# Patient Record
Sex: MALE | Race: WHITE | Employment: OTHER | URBAN - METROPOLITAN AREA
[De-identification: names, ages, dates, MRNs, and addresses within clinical notes are randomized per-mention and may not be internally consistent; named-entity substitution may affect disease eponyms.]

---

## 2018-03-12 ENCOUNTER — HOSPITAL ENCOUNTER (OUTPATIENT)
Age: 78
Discharge: HOME | End: 2018-03-14
Payer: MEDICARE

## 2018-03-12 LAB
ALBUMIN SERPL-MCNC: 4 G/DL (ref 3.5–5.2)
ALP BLD-CCNC: 76 U/L (ref 40–129)
ALT SERPL-CCNC: 15 U/L (ref 0–40)
ANION GAP SERPL CALCULATED.3IONS-SCNC: 12 MMOL/L (ref 7–16)
AST SERPL-CCNC: 18 U/L (ref 0–39)
BILIRUB SERPL-MCNC: 0.5 MG/DL (ref 0–1.2)
BUN BLDV-MCNC: 15 MG/DL (ref 8–23)
CALCIUM SERPL-MCNC: 8.9 MG/DL (ref 8.6–10.2)
CHLORIDE BLD-SCNC: 101 MMOL/L (ref 98–107)
CHOLESTEROL, TOTAL: 172 MG/DL (ref 0–199)
CO2: 28 MMOL/L (ref 22–29)
CREAT SERPL-MCNC: 1.1 MG/DL (ref 0.7–1.2)
GFR AFRICAN AMERICAN: >60
GFR NON-AFRICAN AMERICAN: >60 ML/MIN/1.73
GLUCOSE BLD-MCNC: 91 MG/DL (ref 74–109)
HDLC SERPL-MCNC: 34 MG/DL
LDL CHOLESTEROL CALCULATED: 110 MG/DL (ref 0–99)
POTASSIUM SERPL-SCNC: 4.2 MMOL/L (ref 3.5–5)
SODIUM BLD-SCNC: 141 MMOL/L (ref 132–146)
T3 TOTAL: 111.1 NG/DL (ref 80–200)
T4 TOTAL: 6.8 MCG/DL (ref 4.5–11.7)
TOTAL PROTEIN: 6.5 G/DL (ref 6.4–8.3)
TRIGL SERPL-MCNC: 142 MG/DL (ref 0–149)
TSH SERPL DL<=0.05 MIU/L-ACNC: 3.5 UIU/ML (ref 0.27–4.2)
VLDLC SERPL CALC-MCNC: 28 MG/DL

## 2018-03-12 PROCEDURE — 84436 ASSAY OF TOTAL THYROXINE: CPT

## 2018-03-12 PROCEDURE — 80061 LIPID PANEL: CPT

## 2018-03-12 PROCEDURE — 84443 ASSAY THYROID STIM HORMONE: CPT

## 2018-03-12 PROCEDURE — 84480 ASSAY TRIIODOTHYRONINE (T3): CPT

## 2018-03-12 PROCEDURE — 36415 COLL VENOUS BLD VENIPUNCTURE: CPT

## 2018-03-12 PROCEDURE — 80053 COMPREHEN METABOLIC PANEL: CPT

## 2018-05-10 ENCOUNTER — HOSPITAL ENCOUNTER (OUTPATIENT)
Dept: CT IMAGING | Age: 78
Discharge: HOME | End: 2018-05-10
Payer: MEDICARE

## 2018-05-10 DIAGNOSIS — I73.9 PVD (PERIPHERAL VASCULAR DISEASE) (HCC): ICD-10-CM

## 2018-05-10 LAB
BUN BLDV-MCNC: 15 MG/DL (ref 8–23)
CREAT SERPL-MCNC: 1.1 MG/DL (ref 0.7–1.2)
GFR AFRICAN AMERICAN: >60
GFR NON-AFRICAN AMERICAN: >60 ML/MIN/1.73

## 2018-05-10 PROCEDURE — 6360000004 HC RX CONTRAST MEDICATION: Performed by: RADIOLOGY

## 2018-05-10 PROCEDURE — 75635 CT ANGIO ABDOMINAL ARTERIES: CPT

## 2018-05-10 PROCEDURE — 36415 COLL VENOUS BLD VENIPUNCTURE: CPT

## 2018-05-10 PROCEDURE — 82565 ASSAY OF CREATININE: CPT

## 2018-05-10 PROCEDURE — 84520 ASSAY OF UREA NITROGEN: CPT

## 2018-05-10 RX ADMIN — IOPAMIDOL 150 ML: 755 INJECTION, SOLUTION INTRAVENOUS at 10:06

## 2018-10-04 ENCOUNTER — OFFICE VISIT (OUTPATIENT)
Dept: FAMILY MEDICINE CLINIC | Age: 78
End: 2018-10-04
Payer: MEDICARE

## 2018-10-04 VITALS
HEART RATE: 80 BPM | BODY MASS INDEX: 27.74 KG/M2 | TEMPERATURE: 98.4 F | SYSTOLIC BLOOD PRESSURE: 132 MMHG | DIASTOLIC BLOOD PRESSURE: 80 MMHG | WEIGHT: 183 LBS | HEIGHT: 68 IN | RESPIRATION RATE: 20 BRPM | OXYGEN SATURATION: 97 %

## 2018-10-04 DIAGNOSIS — J01.10 ACUTE NON-RECURRENT FRONTAL SINUSITIS: Primary | ICD-10-CM

## 2018-10-04 PROCEDURE — G8427 DOCREV CUR MEDS BY ELIG CLIN: HCPCS | Performed by: PHYSICIAN ASSISTANT

## 2018-10-04 PROCEDURE — 1101F PT FALLS ASSESS-DOCD LE1/YR: CPT | Performed by: PHYSICIAN ASSISTANT

## 2018-10-04 PROCEDURE — 4004F PT TOBACCO SCREEN RCVD TLK: CPT | Performed by: PHYSICIAN ASSISTANT

## 2018-10-04 PROCEDURE — G8484 FLU IMMUNIZE NO ADMIN: HCPCS | Performed by: PHYSICIAN ASSISTANT

## 2018-10-04 PROCEDURE — G8419 CALC BMI OUT NRM PARAM NOF/U: HCPCS | Performed by: PHYSICIAN ASSISTANT

## 2018-10-04 PROCEDURE — 4040F PNEUMOC VAC/ADMIN/RCVD: CPT | Performed by: PHYSICIAN ASSISTANT

## 2018-10-04 PROCEDURE — 99213 OFFICE O/P EST LOW 20 MIN: CPT | Performed by: PHYSICIAN ASSISTANT

## 2018-10-04 PROCEDURE — 1123F ACP DISCUSS/DSCN MKR DOCD: CPT | Performed by: PHYSICIAN ASSISTANT

## 2018-10-04 RX ORDER — AMOXICILLIN 500 MG/1
500 CAPSULE ORAL 2 TIMES DAILY
Qty: 20 CAPSULE | Refills: 0 | Status: SHIPPED | OUTPATIENT
Start: 2018-10-04 | End: 2018-10-14

## 2019-08-01 ENCOUNTER — HOSPITAL ENCOUNTER (OUTPATIENT)
Age: 79
Discharge: HOME | End: 2019-08-03
Payer: MEDICARE

## 2019-08-01 LAB
ALBUMIN SERPL-MCNC: 4.2 G/DL (ref 3.5–5.2)
ALP BLD-CCNC: 84 U/L (ref 40–129)
ALT SERPL-CCNC: 23 U/L (ref 0–40)
ANION GAP SERPL CALCULATED.3IONS-SCNC: 14 MMOL/L (ref 7–16)
AST SERPL-CCNC: 21 U/L (ref 0–39)
BILIRUB SERPL-MCNC: 0.6 MG/DL (ref 0–1.2)
BUN BLDV-MCNC: 14 MG/DL (ref 8–23)
CALCIUM SERPL-MCNC: 9 MG/DL (ref 8.6–10.2)
CHLORIDE BLD-SCNC: 105 MMOL/L (ref 98–107)
CHOLESTEROL, TOTAL: 162 MG/DL (ref 0–199)
CO2: 24 MMOL/L (ref 22–29)
CREAT SERPL-MCNC: 1.1 MG/DL (ref 0.7–1.2)
GFR AFRICAN AMERICAN: >60
GFR NON-AFRICAN AMERICAN: >60 ML/MIN/1.73
GLUCOSE BLD-MCNC: 89 MG/DL (ref 74–99)
HDLC SERPL-MCNC: 33 MG/DL
LDL CHOLESTEROL CALCULATED: 102 MG/DL (ref 0–99)
POTASSIUM SERPL-SCNC: 4.6 MMOL/L (ref 3.5–5)
SODIUM BLD-SCNC: 143 MMOL/L (ref 132–146)
T3 UPTAKE PERCENT: 33.7 % (ref 22.5–37)
T4 TOTAL: 6.6 MCG/DL (ref 4.5–11.7)
TOTAL PROTEIN: 6.7 G/DL (ref 6.4–8.3)
TRIGL SERPL-MCNC: 137 MG/DL (ref 0–149)
TSH SERPL DL<=0.05 MIU/L-ACNC: 3.48 UIU/ML (ref 0.27–4.2)
VLDLC SERPL CALC-MCNC: 27 MG/DL

## 2019-08-01 PROCEDURE — 80061 LIPID PANEL: CPT

## 2019-08-01 PROCEDURE — 84443 ASSAY THYROID STIM HORMONE: CPT

## 2019-08-01 PROCEDURE — 80053 COMPREHEN METABOLIC PANEL: CPT

## 2019-08-01 PROCEDURE — 84436 ASSAY OF TOTAL THYROXINE: CPT

## 2019-08-01 PROCEDURE — 36415 COLL VENOUS BLD VENIPUNCTURE: CPT

## 2019-08-01 PROCEDURE — 84479 ASSAY OF THYROID (T3 OR T4): CPT

## 2020-05-12 ENCOUNTER — HOSPITAL ENCOUNTER (EMERGENCY)
Age: 80
Discharge: HOME | End: 2020-05-12
Payer: MEDICARE

## 2020-05-12 VITALS
WEIGHT: 180 LBS | TEMPERATURE: 98.2 F | BODY MASS INDEX: 27.37 KG/M2 | RESPIRATION RATE: 20 BRPM | OXYGEN SATURATION: 96 % | DIASTOLIC BLOOD PRESSURE: 73 MMHG | HEART RATE: 73 BPM | SYSTOLIC BLOOD PRESSURE: 145 MMHG

## 2020-05-12 PROCEDURE — 99212 OFFICE O/P EST SF 10 MIN: CPT

## 2020-05-12 RX ORDER — DOXYCYCLINE 100 MG/1
100 CAPSULE ORAL 2 TIMES DAILY
Qty: 20 CAPSULE | Refills: 0 | Status: SHIPPED | OUTPATIENT
Start: 2020-05-12 | End: 2020-05-22

## 2020-05-12 NOTE — ED PROVIDER NOTES
This is a 78-year-old male that presents to urgent care complaining of right groin pain. He does state that he is pretty healthy for his age but noticed a raised tender red area that was draining in his right groin area today. He denies any fever or chills. No difficulty having bowel movements. No urinary symptoms. I first contact patient he appears to be in no acute distress. Review of Systems   Constitutional:        Pertinent positives and negatives are stated within HPI, all other systems reviewed and are negative. Physical Exam  Vitals signs and nursing note reviewed. Constitutional:       Appearance: He is well-developed. HENT:      Head: Normocephalic and atraumatic. Jaw: No trismus. Right Ear: Hearing, tympanic membrane, ear canal and external ear normal.      Left Ear: Hearing, tympanic membrane, ear canal and external ear normal.      Nose: Nose normal.      Right Sinus: No maxillary sinus tenderness or frontal sinus tenderness. Left Sinus: No maxillary sinus tenderness or frontal sinus tenderness. Mouth/Throat:      Pharynx: Uvula midline. No uvula swelling. Eyes:      General: Lids are normal.      Conjunctiva/sclera: Conjunctivae normal.      Pupils: Pupils are equal, round, and reactive to light. Neck:      Musculoskeletal: Normal range of motion and neck supple. Cardiovascular:      Rate and Rhythm: Normal rate and regular rhythm. Heart sounds: Normal heart sounds. No murmur. Pulmonary:      Effort: Pulmonary effort is normal.      Breath sounds: Normal breath sounds. Abdominal:      General: Bowel sounds are normal.      Palpations: Abdomen is soft. Abdomen is not rigid. Tenderness: There is no abdominal tenderness. There is no guarding or rebound. Skin:     General: Skin is warm and dry. Findings: No abrasion or rash. Comments:  In his right suprapubic area he does have a pustule or very small abscess about 1.5 cm diameter that is draining pus. There is no red streaking. There is just very mild erythema around the abscess itself. He has no pain to deep palpation in the area. The penis and the scrotum appear to be normal.  I do not see any other areas. Neurological:      Mental Status: He is alert and oriented to person, place, and time. GCS: GCS eye subscore is 4. GCS verbal subscore is 5. GCS motor subscore is 6. Cranial Nerves: No cranial nerve deficit. Sensory: No sensory deficit. Coordination: Coordination normal.      Gait: Gait normal.         Procedures    MDM  Number of Diagnoses or Management Options  Abscess:   Diagnosis management comments: This patient has a localized pustule or abscess. I will place him on an antibiotic I was able to clean off the pus and expressed some of the pus from the area. I told him to keep the area clean and return if symptoms worsen. --------------------------------------------- PAST HISTORY ---------------------------------------------  Past Medical History:  has a past medical history of Hyperlipidemia. Past Surgical History:  has a past surgical history that includes hernia repair; Cholecystectomy; and Colonoscopy (1990). Social History:  reports that he has been smoking. He has been smoking about 1.00 pack per day. He has never used smokeless tobacco. He reports that he does not drink alcohol or use drugs. Family History: family history is not on file. The patients home medications have been reviewed. Allergies: Patient has no known allergies. -------------------------------------------------- RESULTS -------------------------------------------------  No results found for this visit on 05/12/20. No orders to display       ------------------------- NURSING NOTES AND VITALS REVIEWED ---------------------------   The nursing notes within the ED encounter and vital signs as below have been reviewed.    BP (!) 145/73   Pulse 73   Temp 98.2 °F (36.8 °C) (Oral)   Resp 20   Wt 180 lb (81.6 kg)   SpO2 96%   BMI 27.37 kg/m²   Oxygen Saturation Interpretation: Normal      ------------------------------------------ PROGRESS NOTES ------------------------------------------   I have spoken with the patient and discussed todays results, in addition to providing specific details for the plan of care and counseling regarding the diagnosis and prognosis. Their questions are answered at this time and they are agreeable with the plan.      --------------------------------- ADDITIONAL PROVIDER NOTES --------------------------------     This patient is stable for discharge. I have shared the specific conditions for return, as well as the importance of follow-up. * NOTE: This report was transcribed using voice recognition software. Every effort was made to ensure accuracy; however, inadvertent computerized transcription errors may be present.    --------------------------------- IMPRESSION AND DISPOSITION ---------------------------------    IMPRESSION  1.  Abscess        DISPOSITION  Disposition: Discharge to home  Patient condition is good         Francie Cho PA-C  05/12/20 9802

## 2021-01-26 ENCOUNTER — IMMUNIZATION (OUTPATIENT)
Dept: PRIMARY CARE CLINIC | Age: 81
End: 2021-01-26
Payer: MEDICARE

## 2021-01-26 PROCEDURE — 91301 COVID-19, MODERNA VACCINE 100MCG/0.5ML DOSE: CPT | Performed by: NURSE PRACTITIONER

## 2021-01-26 PROCEDURE — 0011A COVID-19, MODERNA VACCINE 100MCG/0.5ML DOSE: CPT | Performed by: NURSE PRACTITIONER

## 2021-02-23 ENCOUNTER — IMMUNIZATION (OUTPATIENT)
Dept: PRIMARY CARE CLINIC | Age: 81
End: 2021-02-23
Payer: MEDICARE

## 2021-02-23 PROCEDURE — 0012A COVID-19, MODERNA VACCINE 100MCG/0.5ML DOSE: CPT | Performed by: NURSE PRACTITIONER

## 2021-02-23 PROCEDURE — 91301 COVID-19, MODERNA VACCINE 100MCG/0.5ML DOSE: CPT | Performed by: NURSE PRACTITIONER

## 2021-06-23 NOTE — H&P
History and Physical    Patient's Name/Date of Birth: Ann Porter / 1940 (12 y.o.)    Date: June 23, 2021     Chief Complaint: Decreased vision of the left eye    HPI: Mature left cataract with decreased vision . Risks and complications as well as options and benefits  were discussed with the patient and he has elected to proceed with left cataract extraction with intra ocular lens implant. Past Medical History:   Diagnosis Date    Hyperlipidemia        Past Surgical History:   Procedure Laterality Date    CHOLECYSTECTOMY      COLONOSCOPY  200    neg    HERNIA REPAIR         Prior to Admission medications    Not on File       Patient has no known allergies. History reviewed. No pertinent family history. Social History     Socioeconomic History    Marital status:      Spouse name: Not on file    Number of children: Not on file    Years of education: Not on file    Highest education level: Not on file   Occupational History    Occupation: retired   Tobacco Use    Smoking status: Current Every Day Smoker     Packs/day: 1.00    Smokeless tobacco: Never Used   Substance and Sexual Activity    Alcohol use: No    Drug use: No    Sexual activity: Never   Other Topics Concern    Not on file   Social History Narrative    Not on file     Social Determinants of Health     Financial Resource Strain:     Difficulty of Paying Living Expenses:    Food Insecurity:     Worried About [facility] in the Last Year:     [address] in the Last Year:    Transportation Needs:     Lack of Transportation (Medical):      Lack of Transportation (Non-Medical):    Physical Activity:     Days of Exercise per Week:     Minutes of Exercise per Session:    Stress:     Feeling of Stress :    Social Connections:     Frequency of Communication with Friends and Family:     Frequency of Social Gatherings with Friends and Family:     Attends [employer] Services:     Active Member of Clubs or Organizations:     Attends Club or Organization Meetings:     Marital Status:    Intimate Partner Violence:     Fear of Current or Ex-Partner:     Emotionally Abused:     Physically Abused:     Sexually Abused:        Review of Systems:   CONSTITUTIONAL:  negative  EYES:  negative  HEENT:  negative  RESPIRATORY:  negative  CARDIOVASCULAR:  negative  GASTROINTESTINAL:  negative  GENITOURINARY:  negative  INTEGUMENT/BREAST:  negative  HEMATOLOGIC/LYMPHATIC:  negative  ALLERGIC/IMMUNOLOGIC:  negative  ENDOCRINE:  negative  MUSCULOSKELETAL:  negative  NEUROLOGICAL:  negative  BEHAVIOR/PSYCH:  Negative        Physical Exam:  Vitals:    06/23/21 1217   Weight: 160 lb (72.6 kg)   Height: 5' 6\" (1.676 m)       CONSTITUTIONAL: Oriented to person, place and time  EYES:  Mature left cataract with decreased vision effecting reading, driving and all routine home activities. Visual acuity is 20/70    ENT:  Normocephalic, without obvious abnormality, atraumatic, sinuses nontender on palpation, external ears without lesions, oral pharynx with moist mucus membranes, tonsils without erythema or exudates, gums normal and good dentition.   NECK:  Supple, symmetrical, trachea midline, no adenopathy, thyroid symmetric, not enlarged and no tenderness, skin normal  HEMATOLOGIC/LYMPHATICS:  no cervical lymphadenopathy and no supraclavicular lymphadenopathy  BACK:  Symmetric, no curvature, spinous processes are non-tender on palpation, paraspinous muscles are non-tender on palpation, no costal vertebral tenderness  LUNGS:  No increased work of breathing, good air exchange, clear to auscultation bilaterally, no crackles or wheezing  CARDIOVASCULAR:  Normal apical impulse, regular rate and rhythm, normal S1 and S2, no S3 or S4, and no murmur noted  ABDOMEN:  No scars, normal bowel sounds, soft, non-distended, non-tender, no masses palpated, no hepatosplenomegally  CHEST/BREASTS:  Breasts symmetrical, skin without lesion(s), no nipple retraction or dimpling, no nipple discharge, no masses palpated, no axillary or supraclavicular adenopathy  GENITAL/URINARY:  Deferred  MUSCULOSKELETAL:  There is no redness, warmth, or swelling of the joints. Full range of motion noted. Motor strength is 5 out of 5 all extremities bilaterally. Tone is normal.  NEUROLOGIC:  Awake, alert, oriented to name, place and time. Cranial nerves II-XII are grossly intact. Motor is 5 out of 5 bilaterally. Cerebellar finger to nose, heel to shin intact. Sensory is intact. Babinski down going, Romberg negative, and gait is normal.  SKIN:  no bruising or bleeding, normal skin color, texture, turgor and no redness, warmth, or swelling    Assessment:  Active Problems:    * No active hospital problems. *  Resolved Problems:    * No resolved hospital problems. *      Plan:  1.  Left cataract extraction with intra ocular lens implant    Electronically signed by Saray Puente MD on 6/23/21 at 3:21 PM EDT

## 2021-06-25 ENCOUNTER — ANESTHESIA EVENT (OUTPATIENT)
Dept: OPERATING ROOM | Age: 81
End: 2021-06-25
Payer: MEDICARE

## 2021-06-29 ENCOUNTER — ANESTHESIA (OUTPATIENT)
Dept: OPERATING ROOM | Age: 81
End: 2021-06-29
Payer: MEDICARE

## 2021-06-29 ENCOUNTER — HOSPITAL ENCOUNTER (OUTPATIENT)
Age: 81
Setting detail: OUTPATIENT SURGERY
Discharge: HOME | End: 2021-06-29
Attending: OPHTHALMOLOGY | Admitting: OPHTHALMOLOGY
Payer: MEDICARE

## 2021-06-29 VITALS
SYSTOLIC BLOOD PRESSURE: 154 MMHG | DIASTOLIC BLOOD PRESSURE: 73 MMHG | OXYGEN SATURATION: 94 % | RESPIRATION RATE: 24 BRPM

## 2021-06-29 VITALS
DIASTOLIC BLOOD PRESSURE: 71 MMHG | TEMPERATURE: 97 F | RESPIRATION RATE: 16 BRPM | SYSTOLIC BLOOD PRESSURE: 156 MMHG | OXYGEN SATURATION: 97 % | WEIGHT: 175.6 LBS | BODY MASS INDEX: 28.22 KG/M2 | HEIGHT: 66 IN | HEART RATE: 66 BPM

## 2021-06-29 PROBLEM — H26.9 LEFT CATARACT: Status: ACTIVE | Noted: 2021-06-29

## 2021-06-29 PROCEDURE — 2500000003 HC RX 250 WO HCPCS: Performed by: NURSE ANESTHETIST, CERTIFIED REGISTERED

## 2021-06-29 PROCEDURE — 6370000000 HC RX 637 (ALT 250 FOR IP): Performed by: OPHTHALMOLOGY

## 2021-06-29 PROCEDURE — 7100000010 HC PHASE II RECOVERY - FIRST 15 MIN: Performed by: OPHTHALMOLOGY

## 2021-06-29 PROCEDURE — 2500000003 HC RX 250 WO HCPCS: Performed by: OPHTHALMOLOGY

## 2021-06-29 PROCEDURE — 7100000011 HC PHASE II RECOVERY - ADDTL 15 MIN: Performed by: OPHTHALMOLOGY

## 2021-06-29 PROCEDURE — 3600000003 HC SURGERY LEVEL 3 BASE: Performed by: OPHTHALMOLOGY

## 2021-06-29 PROCEDURE — V2632 POST CHMBR INTRAOCULAR LENS: HCPCS | Performed by: OPHTHALMOLOGY

## 2021-06-29 PROCEDURE — 2580000003 HC RX 258: Performed by: ANESTHESIOLOGY

## 2021-06-29 PROCEDURE — 3700000001 HC ADD 15 MINUTES (ANESTHESIA): Performed by: OPHTHALMOLOGY

## 2021-06-29 PROCEDURE — 2709999900 HC NON-CHARGEABLE SUPPLY: Performed by: OPHTHALMOLOGY

## 2021-06-29 PROCEDURE — 3700000000 HC ANESTHESIA ATTENDED CARE: Performed by: OPHTHALMOLOGY

## 2021-06-29 PROCEDURE — 3600000013 HC SURGERY LEVEL 3 ADDTL 15MIN: Performed by: OPHTHALMOLOGY

## 2021-06-29 DEVICE — LENS INTOCU +21.5 DIOPT A CONSTANT 118.8 L13MM DIA6MM 0DEG: Type: IMPLANTABLE DEVICE | Site: EYE | Status: FUNCTIONAL

## 2021-06-29 RX ORDER — HYDROCODONE BITARTRATE AND ACETAMINOPHEN 5; 325 MG/1; MG/1
1 TABLET ORAL PRN
Status: DISCONTINUED | OUTPATIENT
Start: 2021-06-29 | End: 2021-06-29 | Stop reason: HOSPADM

## 2021-06-29 RX ORDER — SODIUM CHLORIDE, SODIUM LACTATE, POTASSIUM CHLORIDE, CALCIUM CHLORIDE 600; 310; 30; 20 MG/100ML; MG/100ML; MG/100ML; MG/100ML
INJECTION, SOLUTION INTRAVENOUS CONTINUOUS
Status: DISCONTINUED | OUTPATIENT
Start: 2021-06-29 | End: 2021-06-29 | Stop reason: HOSPADM

## 2021-06-29 RX ORDER — PHENYLEPHRINE HYDROCHLORIDE 100 MG/ML
1 SOLUTION/ DROPS OPHTHALMIC PRN
Status: DISCONTINUED | OUTPATIENT
Start: 2021-06-29 | End: 2021-06-29 | Stop reason: HOSPADM

## 2021-06-29 RX ORDER — HYDRALAZINE HYDROCHLORIDE 20 MG/ML
5 INJECTION INTRAMUSCULAR; INTRAVENOUS EVERY 10 MIN PRN
Status: DISCONTINUED | OUTPATIENT
Start: 2021-06-29 | End: 2021-06-29 | Stop reason: HOSPADM

## 2021-06-29 RX ORDER — FLURBIPROFEN SODIUM 0.3 MG/ML
1 SOLUTION/ DROPS OPHTHALMIC
Status: COMPLETED | OUTPATIENT
Start: 2021-06-29 | End: 2021-06-29

## 2021-06-29 RX ORDER — CYCLOPENTOLATE HYDROCHLORIDE 10 MG/ML
1 SOLUTION/ DROPS OPHTHALMIC
Status: COMPLETED | OUTPATIENT
Start: 2021-06-29 | End: 2021-06-29

## 2021-06-29 RX ORDER — MEPERIDINE HYDROCHLORIDE 25 MG/ML
12.5 INJECTION INTRAMUSCULAR; INTRAVENOUS; SUBCUTANEOUS EVERY 5 MIN PRN
Status: DISCONTINUED | OUTPATIENT
Start: 2021-06-29 | End: 2021-06-29 | Stop reason: HOSPADM

## 2021-06-29 RX ORDER — TETRACAINE HYDROCHLORIDE 5 MG/ML
1 SOLUTION OPHTHALMIC ONCE
Status: COMPLETED | OUTPATIENT
Start: 2021-06-29 | End: 2021-06-29

## 2021-06-29 RX ORDER — FENTANYL CITRATE 50 UG/ML
50 INJECTION, SOLUTION INTRAMUSCULAR; INTRAVENOUS EVERY 5 MIN PRN
Status: DISCONTINUED | OUTPATIENT
Start: 2021-06-29 | End: 2021-06-29 | Stop reason: HOSPADM

## 2021-06-29 RX ORDER — PHENYLEPHRINE HCL 2.5 %
1 DROPS OPHTHALMIC (EYE)
Status: COMPLETED | OUTPATIENT
Start: 2021-06-29 | End: 2021-06-29

## 2021-06-29 RX ORDER — ALFENTANIL HYDROCHLORIDE 500 UG/ML
INJECTION INTRAVENOUS PRN
Status: DISCONTINUED | OUTPATIENT
Start: 2021-06-29 | End: 2021-06-29 | Stop reason: SDUPTHER

## 2021-06-29 RX ORDER — DIPHENHYDRAMINE HYDROCHLORIDE 50 MG/ML
12.5 INJECTION INTRAMUSCULAR; INTRAVENOUS
Status: DISCONTINUED | OUTPATIENT
Start: 2021-06-29 | End: 2021-06-29 | Stop reason: HOSPADM

## 2021-06-29 RX ORDER — BALANCED SALT SOLUTION 6.4; .75; .48; .3; 3.9; 1.7 MG/ML; MG/ML; MG/ML; MG/ML; MG/ML; MG/ML
SOLUTION OPHTHALMIC PRN
Status: DISCONTINUED | OUTPATIENT
Start: 2021-06-29 | End: 2021-06-29 | Stop reason: ALTCHOICE

## 2021-06-29 RX ORDER — LABETALOL HYDROCHLORIDE 5 MG/ML
5 INJECTION, SOLUTION INTRAVENOUS EVERY 10 MIN PRN
Status: DISCONTINUED | OUTPATIENT
Start: 2021-06-29 | End: 2021-06-29 | Stop reason: HOSPADM

## 2021-06-29 RX ORDER — HYDROCODONE BITARTRATE AND ACETAMINOPHEN 5; 325 MG/1; MG/1
2 TABLET ORAL PRN
Status: DISCONTINUED | OUTPATIENT
Start: 2021-06-29 | End: 2021-06-29 | Stop reason: HOSPADM

## 2021-06-29 RX ORDER — TETRACAINE HYDROCHLORIDE 5 MG/ML
SOLUTION OPHTHALMIC PRN
Status: DISCONTINUED | OUTPATIENT
Start: 2021-06-29 | End: 2021-06-29 | Stop reason: ALTCHOICE

## 2021-06-29 RX ORDER — MORPHINE SULFATE 2 MG/ML
1 INJECTION, SOLUTION INTRAMUSCULAR; INTRAVENOUS EVERY 5 MIN PRN
Status: DISCONTINUED | OUTPATIENT
Start: 2021-06-29 | End: 2021-06-29 | Stop reason: HOSPADM

## 2021-06-29 RX ORDER — PROMETHAZINE HYDROCHLORIDE 25 MG/ML
25 INJECTION, SOLUTION INTRAMUSCULAR; INTRAVENOUS
Status: DISCONTINUED | OUTPATIENT
Start: 2021-06-29 | End: 2021-06-29 | Stop reason: HOSPADM

## 2021-06-29 RX ADMIN — CYCLOPENTOLATE HYDROCHLORIDE 1 DROP: 10 SOLUTION/ DROPS OPHTHALMIC at 05:40

## 2021-06-29 RX ADMIN — CYCLOPENTOLATE HYDROCHLORIDE 1 DROP: 10 SOLUTION/ DROPS OPHTHALMIC at 05:35

## 2021-06-29 RX ADMIN — ALFENTANIL HYDROCHLORIDE 125 MCG: 500 INJECTION INTRAVENOUS at 06:39

## 2021-06-29 RX ADMIN — PHENYLEPHRINE HYDROCHLORIDE 1 DROP: 25 SOLUTION/ DROPS OPHTHALMIC at 05:40

## 2021-06-29 RX ADMIN — ALFENTANIL HYDROCHLORIDE 250 MCG: 500 INJECTION INTRAVENOUS at 06:37

## 2021-06-29 RX ADMIN — FLURBIPROFEN SODIUM 1 DROP: 0.3 SOLUTION/ DROPS OPHTHALMIC at 05:35

## 2021-06-29 RX ADMIN — FLURBIPROFEN SODIUM 1 DROP: 0.3 SOLUTION/ DROPS OPHTHALMIC at 05:30

## 2021-06-29 RX ADMIN — ALFENTANIL HYDROCHLORIDE 125 MCG: 500 INJECTION INTRAVENOUS at 06:35

## 2021-06-29 RX ADMIN — TETRACAINE HYDROCHLORIDE 1 DROP: 25 LIQUID OPHTHALMIC at 06:04

## 2021-06-29 RX ADMIN — PHENYLEPHRINE HYDROCHLORIDE 1 DROP: 25 SOLUTION/ DROPS OPHTHALMIC at 05:30

## 2021-06-29 RX ADMIN — FLURBIPROFEN SODIUM 1 DROP: 0.3 SOLUTION/ DROPS OPHTHALMIC at 05:40

## 2021-06-29 RX ADMIN — ALFENTANIL HYDROCHLORIDE 250 MCG: 500 INJECTION INTRAVENOUS at 06:44

## 2021-06-29 RX ADMIN — ALFENTANIL HYDROCHLORIDE 250 MCG: 500 INJECTION INTRAVENOUS at 06:41

## 2021-06-29 RX ADMIN — PHENYLEPHRINE HYDROCHLORIDE 1 DROP: 25 SOLUTION/ DROPS OPHTHALMIC at 05:35

## 2021-06-29 RX ADMIN — CYCLOPENTOLATE HYDROCHLORIDE 1 DROP: 10 SOLUTION/ DROPS OPHTHALMIC at 05:30

## 2021-06-29 RX ADMIN — SODIUM CHLORIDE, POTASSIUM CHLORIDE, SODIUM LACTATE AND CALCIUM CHLORIDE: 600; 310; 30; 20 INJECTION, SOLUTION INTRAVENOUS at 06:04

## 2021-06-29 ASSESSMENT — PAIN SCALES - GENERAL
PAINLEVEL_OUTOF10: 0

## 2021-06-29 ASSESSMENT — LIFESTYLE VARIABLES: SMOKING_STATUS: 1

## 2021-06-29 ASSESSMENT — PAIN - FUNCTIONAL ASSESSMENT: PAIN_FUNCTIONAL_ASSESSMENT: 0-10

## 2021-06-29 NOTE — ANESTHESIA POSTPROCEDURE EVALUATION
Department of Anesthesiology  Postprocedure Note    Patient: Sofia Valdez  MRN: [id number]  YOB: 1940  Date of evaluation: 6/29/2021  Time:  8:02 AM     Procedure Summary     Date: 06/29/21 Room / Location: [address] [place] [address]    Anesthesia Start: 6587 Anesthesia Stop: 5016    Procedure: LEFT CATARACT EXTRACTION WITH IOL (Left ) Diagnosis: (LEFT CATARACT)    Surgeons: Hector Persaud MD Responsible Provider: Shawanda Chaudhari MD    Anesthesia Type: MAC ASA Status: 2          Anesthesia Type: MAC    Frances Phase I: Frances Score: 10    Frances Phase II: Frances Score: 10    Last vitals: Reviewed and per EMR flowsheets.        Anesthesia Post Evaluation    Patient location during evaluation: PACU  Patient participation: complete - patient participated  Level of consciousness: awake and alert  Airway patency: patent  Nausea & Vomiting: no nausea and no vomiting  Complications: no  Respiratory status: room air and spontaneous ventilation  Hydration status: stable

## 2021-06-29 NOTE — H&P
Update History & Physical    The patient's History and Physical of 6 / 23 / 2021 was reviewed with the patient and there were no significant changes. I examined the patient and there were no significant changes from the previous History and Physical.    Plan: The risk, benefits, expected outcome, and alternative to the recommended procedure have been discussed with the patient. Patient understands and wants to proceed with the procedure.     Electronically signed by Jocelynn Cervantes MD on 6/29/21 at 6:41 AM EDT

## 2021-09-22 ENCOUNTER — OFFICE VISIT (OUTPATIENT)
Dept: FAMILY MEDICINE CLINIC | Age: 81
End: 2021-09-22
Payer: MEDICARE

## 2021-09-22 VITALS
SYSTOLIC BLOOD PRESSURE: 131 MMHG | DIASTOLIC BLOOD PRESSURE: 72 MMHG | HEIGHT: 66 IN | HEART RATE: 75 BPM | TEMPERATURE: 97.2 F | RESPIRATION RATE: 17 BRPM | WEIGHT: 175 LBS | BODY MASS INDEX: 28.12 KG/M2 | OXYGEN SATURATION: 98 %

## 2021-09-22 DIAGNOSIS — M25.511 ACUTE PAIN OF RIGHT SHOULDER: Primary | ICD-10-CM
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
## 2021-09-22 PROCEDURE — G8417 CALC BMI ABV UP PARAM F/U: HCPCS | Performed by: NURSE PRACTITIONER

## 2021-09-22 PROCEDURE — 96372 THER/PROPH/DIAG INJ SC/IM: CPT | Performed by: NURSE PRACTITIONER

## 2021-09-22 PROCEDURE — 1123F ACP DISCUSS/DSCN MKR DOCD: CPT | Performed by: NURSE PRACTITIONER

## 2021-09-22 PROCEDURE — 99213 OFFICE O/P EST LOW 20 MIN: CPT | Performed by: NURSE PRACTITIONER

## 2021-09-22 PROCEDURE — 4040F PNEUMOC VAC/ADMIN/RCVD: CPT | Performed by: NURSE PRACTITIONER

## 2021-09-22 PROCEDURE — G8427 DOCREV CUR MEDS BY ELIG CLIN: HCPCS | Performed by: NURSE PRACTITIONER

## 2021-09-22 PROCEDURE — 4004F PT TOBACCO SCREEN RCVD TLK: CPT | Performed by: NURSE PRACTITIONER

## 2021-09-22 RX ORDER — TRIAMCINOLONE ACETONIDE 40 MG/ML
40 INJECTION, SUSPENSION INTRA-ARTICULAR; INTRAMUSCULAR ONCE
Status: COMPLETED | OUTPATIENT
Start: 2021-09-22 | End: 2021-09-22

## 2021-09-22 RX ORDER — KETOROLAC TROMETHAMINE 30 MG/ML
30 INJECTION, SOLUTION INTRAMUSCULAR; INTRAVENOUS ONCE
Status: COMPLETED | OUTPATIENT
Start: 2021-09-22 | End: 2021-09-22

## 2021-09-22 RX ADMIN — TRIAMCINOLONE ACETONIDE 40 MG: 40 INJECTION, SUSPENSION INTRA-ARTICULAR; INTRAMUSCULAR at 15:50

## 2021-09-22 RX ADMIN — KETOROLAC TROMETHAMINE 30 MG: 30 INJECTION, SOLUTION INTRAMUSCULAR; INTRAVENOUS at 15:51

## 2021-09-22 SDOH — ECONOMIC STABILITY: FOOD INSECURITY: WITHIN THE PAST 12 MONTHS, THE FOOD YOU BOUGHT JUST DIDN'T LAST AND YOU DIDN'T HAVE MONEY TO GET MORE.: NEVER TRUE

## 2021-09-22 SDOH — ECONOMIC STABILITY: FOOD INSECURITY: WITHIN THE PAST 12 MONTHS, YOU WORRIED THAT YOUR FOOD WOULD RUN OUT BEFORE YOU GOT MONEY TO BUY MORE.: NEVER TRUE

## 2021-09-22 ASSESSMENT — PATIENT HEALTH QUESTIONNAIRE - PHQ9
SUM OF ALL RESPONSES TO PHQ9 QUESTIONS 1 & 2: 0
SUM OF ALL RESPONSES TO PHQ QUESTIONS 1-9: 0
SUM OF ALL RESPONSES TO PHQ QUESTIONS 1-9: 0
1. LITTLE INTEREST OR PLEASURE IN DOING THINGS: 0
2. FEELING DOWN, DEPRESSED OR HOPELESS: 0
SUM OF ALL RESPONSES TO PHQ QUESTIONS 1-9: 0

## 2021-09-22 ASSESSMENT — SOCIAL DETERMINANTS OF HEALTH (SDOH): HOW HARD IS IT FOR YOU TO PAY FOR THE VERY BASICS LIKE FOOD, HOUSING, MEDICAL CARE, AND HEATING?: NOT HARD AT ALL

## 2021-09-22 NOTE — PROGRESS NOTES
Ht 5' 6\" (1.676 m)   Wt 175 lb (79.4 kg)   SpO2 98%   BMI 28.25 kg/m²    Oxygen Saturation Interpretation: Normal.  Constitutional:  Alert, development consistent with age. Neck:  Normal ROM. Supple. Non-tender. Chest: Heart RRR without pathological murmur or gallop. Lungs CTAB without W/R/R. Shoulder:              Tenderness: TTP over right shoulder without any bony point tenderness. Swelling: No obvious swelling noted. Deformity: No obvious deformity. ROM: Limited ROM due to pain. Increased pain with lifting of arm, able to rotate anterior & posterior without limitations. UE/ strength 5/5 bilaterally. (-) drop arm test.            Skin:  No abrasions, bruising, or erythema noted. Neurovascular:              Sensory deficit: Sensation intact proximally and distally to the injury site. Pulse deficit: Distal pulses 2+ and bounding. Capillary refill: Less then 2 sec throughout. Elbow:              Tenderness:  No pain with ROM or palpation. Swelling: No edema noted. ROM: FROM without deficits. No pain with supination or pronation of the hand. Skin:  No rash, abrasions, or bruising noted. Lymphatics: No lymphangitis or adenopathy noted. Neurological: Alert and oriented. Motor functions intact. Lab / Imaging Results   (All laboratory and radiology results have been personally reviewed by myself)  Labs:  No results found for this visit on 09/22/21. Imaging: All Radiology results interpreted by Radiologist unless otherwise noted. Assessment / Plan     Impression(s):  Daljit Rodriguez was seen today for fall. Diagnoses and all orders for this visit:    Acute pain of right shoulder  -     ketorolac (TORADOL) injection 30 mg  -     triamcinolone acetonide (KENALOG-40) injection 40 mg  -     XR SHOULDER RIGHT (MIN 2 VIEWS);  Future    Fall, initial encounter  -     XR SHOULDER RIGHT (MIN 2 VIEWS); Future  - Pt will continue with NSAID's & defers imaging at this time, he is agreeable to obtaining if symptoms persist & f/u with PCP    Disposition:  Disposition: Discharge to stable. Advise rest, ice, and/or moist heat for additional symptomatic relief. PCP in 1-2 weeks for recheck if symptoms persist. ED sooner if symptoms worsen or change. ED immediately with severe or worsening pain, paresthesias, weakness, CP, or SOB. Pt states understanding and is in agreement with this care plan. All questions answered. Michel Duane, ROSA - CNP    **This report was transcribed using voice recognition software. Every effort was made to ensure accuracy; however, inadvertent computerized transcription errors may be present.

## 2021-11-05 ENCOUNTER — IMMUNIZATION (OUTPATIENT)
Dept: PRIMARY CARE CLINIC | Age: 81
End: 2021-11-05
Payer: MEDICARE

## 2021-11-05 PROCEDURE — 91306 COVID-19, MODERNA BOOSTER VACCINE 0.25ML DOSE: CPT | Performed by: NURSE PRACTITIONER

## 2021-11-05 PROCEDURE — 0064A COVID-19, MODERNA BOOSTER VACCINE 0.25ML DOSE: CPT | Performed by: NURSE PRACTITIONER

## 2022-08-15 ENCOUNTER — HOSPITAL ENCOUNTER (EMERGENCY)
Age: 82
Discharge: LEFT BEFORE DISCHARGE | End: 2022-08-15
Payer: MEDICARE

## 2022-08-15 VITALS
SYSTOLIC BLOOD PRESSURE: 117 MMHG | WEIGHT: 205 LBS | TEMPERATURE: 98.3 F | HEART RATE: 60 BPM | HEIGHT: 68 IN | RESPIRATION RATE: 20 BRPM | DIASTOLIC BLOOD PRESSURE: 55 MMHG | OXYGEN SATURATION: 96 % | BODY MASS INDEX: 31.07 KG/M2

## 2022-08-15 DIAGNOSIS — S01.81XA CHIN LACERATION, INITIAL ENCOUNTER: Primary | ICD-10-CM

## 2022-08-15 DIAGNOSIS — S09.93XA FACIAL INJURY, INITIAL ENCOUNTER: ICD-10-CM

## 2022-08-15 PROCEDURE — 99212 OFFICE O/P EST SF 10 MIN: CPT

## 2022-08-15 PROCEDURE — 2500000003 HC RX 250 WO HCPCS: Performed by: NURSE PRACTITIONER

## 2022-08-15 PROCEDURE — 12011 RPR F/E/E/N/L/M 2.5 CM/<: CPT

## 2022-08-15 RX ORDER — LIDOCAINE HYDROCHLORIDE 10 MG/ML
5 INJECTION, SOLUTION INFILTRATION; PERINEURAL ONCE
Status: COMPLETED | OUTPATIENT
Start: 2022-08-15 | End: 2022-08-15

## 2022-08-15 RX ADMIN — LIDOCAINE HYDROCHLORIDE 5 ML: 10 INJECTION, SOLUTION INFILTRATION; PERINEURAL at 16:42

## 2022-08-15 ASSESSMENT — PAIN SCALES - GENERAL
PAINLEVEL_OUTOF10: 0
PAINLEVEL_OUTOF10: 10

## 2022-08-15 ASSESSMENT — PAIN DESCRIPTION - ORIENTATION: ORIENTATION: INNER

## 2022-08-15 ASSESSMENT — PAIN DESCRIPTION - DESCRIPTORS: DESCRIPTORS: SORE

## 2022-08-15 ASSESSMENT — PAIN DESCRIPTION - FREQUENCY: FREQUENCY: CONTINUOUS

## 2022-08-15 ASSESSMENT — PAIN - FUNCTIONAL ASSESSMENT: PAIN_FUNCTIONAL_ASSESSMENT: 0-10

## 2022-08-15 ASSESSMENT — PAIN DESCRIPTION - PAIN TYPE: TYPE: ACUTE PAIN

## 2022-08-15 ASSESSMENT — PAIN DESCRIPTION - ONSET: ONSET: SUDDEN

## 2022-08-15 ASSESSMENT — PAIN DESCRIPTION - LOCATION: LOCATION: MOUTH

## 2022-08-16 NOTE — ED PROVIDER NOTES
Department of Emergency Medicine  ED Provider Note  Admit Date: 8/15/2022  Room: 06/06            HPI:  8/15/22, Time: 11:25 PM EDT  . Hoda Cagle is a 78 y.o. old male presenting to the emergency department for a laceration to the chin. He fell down steps, has pain in his jaw and neck. He wears dentures is complaining of jaw pain especially has pain with opening his mouth. He denies any headache, states that there was no LOC. He is not dizzy, denies any injuries to his extremities, chest, or abdomen. Review of Systems:   Pertinent positives and negatives are stated within HPI, all other systems reviewed and are negative.          --------------------------------------------- PAST HISTORY ---------------------------------------------  Past Medical History:  has a past medical history of Hyperlipidemia. Past Surgical History:  has a past surgical history that includes hernia repair; Cholecystectomy; Colonoscopy (01/01/1990); Intracapsular cataract extraction (Left, 06/29/2021); and eye surgery. Social History:  reports that he has been smoking cigarettes. He has been smoking an average of 1 pack per day. He has never used smokeless tobacco. He reports that he does not drink alcohol and does not use drugs. Family History: family history is not on file. The patients home medications have been reviewed. Allergies: Patient has no known allergies. -------------------------------------------------- RESULTS -------------------------------------------------  All laboratory and radiology results have been personally reviewed by myself   LABS:  No results found for this visit on 08/15/22. RADIOLOGY:  Interpreted by Radiologist.  No orders to display       ------------------------- NURSING NOTES AND VITALS REVIEWED ---------------------------   The nursing notes within the ED encounter and vital signs as below have been reviewed.    BP (!) 117/55   Pulse 60   Temp 98.3 °F (36.8 °C) (Infrared)   Resp 20   Ht 5' 8\" (1.727 m)   Wt 205 lb (93 kg)   SpO2 96%   BMI 31.17 kg/m²   Oxygen Saturation Interpretation: Normal      ---------------------------------------------------PHYSICAL EXAM--------------------------------------      Constitutional/General: Alert and oriented x3, holding his chin, complaining of pain. Head: chin laceration. Mild swelling underneath the chin. Eyes: PERRL, EOMI  Mouth: Oropharynx clear, wears dentures, has bottom plate in his pocket because of the chin injury. Pain with any attempts at opening his jaw. Neck: no midline tenderness  Pulmonary: Lungs clear to auscultation bilaterally, no wheezes, rales, or rhonchi. Not in respiratory distress  Cardiovascular:  Regular rate and rhythm,   Chin Abdomen: Soft, non tender, non distended,   Extremities: Moves all extremities x 4. Warm and well perfused  Skin: warm and dry without rash. There is a laceration of the chin , which measures 2 cm. There is mp evidence of foreign body or gross contamination. Neurologic: GCS 15,  Psych: Normal Affect      ------------------------------ ED COURSE/MEDICAL DECISION MAKING----------------------  Medications   lidocaine 1 % injection 5 mL (5 mLs IntraDERmal Given 8/15/22 5490)             LACERATION REPAIR  PROCEDURE NOTE:  Unless otherwise indicated, this procedure was performed by myself      Laceration #: 1. Location: chin  Length: 2cm. The wound area was irrigated with sterile water and draped in a sterile fashion. The wound area was anesthetized with Lidocaine 1% without epinephrine. WOUND COMPLEXITY:    Debridement: None. Undermining: None. Wound Margins Revised: None. Flaps Aligned: no. The wound was explored with the following results No foreign bodies found. The wound was closed with 5-0 Ethilon using interrupted sutures. Dressing:  a bandage was placed.     Total number suture: 4      Medical Decision Making:    He says his neck is painful but has no midline tenderness. His main pain is his jaw. I wanted to send him for CT scans of his facial bones, neck and head but he refused. He said that he just wanted the laceration repaired, nothing else. I am especially concerned with a Jaw fracture. He also did not want a tetanus shot. He refused  scans and so signed out AMA. I did advise him to return if he changed his mind. He was advised to have the sutures removed in 7 days. Counseling: The emergency provider has spoken with the patient and discussed todays results, in addition to providing specific details for the plan of care and counseling regarding the diagnosis and prognosis. Questions are answered at this time and they are agreeable with the plan.      --------------------------------- IMPRESSION AND DISPOSITION ---------------------------------    IMPRESSION  1. Chin laceration, initial encounter    2.  Facial injury, initial encounter        DISPOSITION  Disposition: Discharge to home  Patient condition is good          ROSA Louise CNP  08/15/22 1040

## 2022-08-22 ENCOUNTER — HOSPITAL ENCOUNTER (EMERGENCY)
Age: 82
Discharge: HOME | End: 2022-08-22
Payer: MEDICARE

## 2022-08-22 VITALS
SYSTOLIC BLOOD PRESSURE: 156 MMHG | HEART RATE: 86 BPM | WEIGHT: 205 LBS | TEMPERATURE: 97.8 F | OXYGEN SATURATION: 96 % | DIASTOLIC BLOOD PRESSURE: 91 MMHG | HEIGHT: 68 IN | BODY MASS INDEX: 31.07 KG/M2 | RESPIRATION RATE: 20 BRPM

## 2022-08-22 DIAGNOSIS — Z48.02 VISIT FOR SUTURE REMOVAL: Primary | ICD-10-CM

## 2022-08-22 PROCEDURE — 99211 OFF/OP EST MAY X REQ PHY/QHP: CPT

## 2022-08-22 ASSESSMENT — PAIN - FUNCTIONAL ASSESSMENT: PAIN_FUNCTIONAL_ASSESSMENT: NONE - DENIES PAIN

## 2022-08-22 ASSESSMENT — PAIN SCALES - GENERAL: PAINLEVEL_OUTOF10: 0

## 2022-12-07 ENCOUNTER — HOSPITAL ENCOUNTER (OUTPATIENT)
Age: 82
Discharge: HOME | End: 2022-12-07
Payer: MEDICARE

## 2022-12-07 LAB
ALBUMIN SERPL-MCNC: 4 G/DL (ref 3.5–5.2)
ALP BLD-CCNC: 93 U/L (ref 40–129)
ALT SERPL-CCNC: 12 U/L (ref 0–40)
ANION GAP SERPL CALCULATED.3IONS-SCNC: 5 MMOL/L (ref 7–16)
AST SERPL-CCNC: 15 U/L (ref 0–39)
BILIRUB SERPL-MCNC: 0.5 MG/DL (ref 0–1.2)
BUN BLDV-MCNC: 16 MG/DL (ref 6–23)
CALCIUM SERPL-MCNC: 9.1 MG/DL (ref 8.6–10.2)
CHLORIDE BLD-SCNC: 103 MMOL/L (ref 98–107)
CHOLESTEROL, FASTING: 230 MG/DL (ref 0–199)
CO2: 30 MMOL/L (ref 22–29)
CREAT SERPL-MCNC: 1 MG/DL (ref 0.7–1.2)
GFR SERPL CREATININE-BSD FRML MDRD: >60 ML/MIN/1.73
GLUCOSE BLD-MCNC: 94 MG/DL (ref 74–99)
HDLC SERPL-MCNC: 38 MG/DL
LDL CHOLESTEROL CALCULATED: 165 MG/DL (ref 0–99)
POTASSIUM SERPL-SCNC: 4.9 MMOL/L (ref 3.5–5)
SODIUM BLD-SCNC: 138 MMOL/L (ref 132–146)
T3 TOTAL: 108.6 NG/DL (ref 80–200)
T4 TOTAL: 7.6 MCG/DL (ref 4.5–11.7)
TOTAL PROTEIN: 6.2 G/DL (ref 6.4–8.3)
TRIGLYCERIDE, FASTING: 135 MG/DL (ref 0–149)
TSH SERPL DL<=0.05 MIU/L-ACNC: 2.38 UIU/ML (ref 0.27–4.2)
VITAMIN D 25-HYDROXY: 38 NG/ML (ref 30–100)
VLDLC SERPL CALC-MCNC: 27 MG/DL

## 2022-12-07 PROCEDURE — 84436 ASSAY OF TOTAL THYROXINE: CPT

## 2022-12-07 PROCEDURE — 86141 C-REACTIVE PROTEIN HS: CPT

## 2022-12-07 PROCEDURE — 36415 COLL VENOUS BLD VENIPUNCTURE: CPT

## 2022-12-07 PROCEDURE — 82306 VITAMIN D 25 HYDROXY: CPT

## 2022-12-07 PROCEDURE — 84443 ASSAY THYROID STIM HORMONE: CPT

## 2022-12-07 PROCEDURE — 80053 COMPREHEN METABOLIC PANEL: CPT

## 2022-12-07 PROCEDURE — 80061 LIPID PANEL: CPT

## 2022-12-07 PROCEDURE — 84480 ASSAY TRIIODOTHYRONINE (T3): CPT

## 2022-12-08 LAB — C-REACTIVE PROTEIN, HIGH SENSITIVITY: 0.8 MG/L (ref 0–3)

## 2024-04-29 NOTE — ANESTHESIA PRE PROCEDURE
Department of Anesthesiology  Preprocedure Note       Name:  Bobby Kramer   Age:  78 y.o.  :  1940                                          MRN:  [id number]         Date:  2021      Surgeon: Henry Ludwig):  Eileen Nathan MD    Procedure: Procedure(s):  LEFT CATARACT EXTRACTION WITH IOL    Medications prior to admission:   Prior to Admission medications    Not on File       Current medications:    No current facility-administered medications for this encounter. No current outpatient medications on file. Allergies:  No Known Allergies    Problem List:  There is no problem list on file for this patient. Past Medical History:        Diagnosis Date    Hyperlipidemia        Past Surgical History:        Procedure Laterality Date    CHOLECYSTECTOMY      COLONOSCOPY  200    neg    HERNIA REPAIR         Social History:    Social History     Tobacco Use    Smoking status: Current Every Day Smoker     Packs/day: 1.00    Smokeless tobacco: Never Used   Substance Use Topics    Alcohol use: No                                Ready to quit: Not Answered  Counseling given: Not Answered      Vital Signs (Current):   Vitals:    21 1217   Weight: 160 lb (72.6 kg)   Height: 5' 6\" (1.676 m)                                              BP Readings from Last 3 Encounters:   20 (!) 145/73   10/04/18 132/80   04/07/15 110/62       NPO Status:  >8.H                                                                               BMI:   Wt Readings from Last 3 Encounters:   21 160 lb (72.6 kg)   20 180 lb (81.6 kg)   10/04/18 183 lb (83 kg)     Body mass index is 25.82 kg/m².     CBC:   Lab Results   Component Value Date    WBC 8.5 2017    RBC 5.16 2017    HGB 16.4 2017    HCT 48.6 2017    MCV 94.2 2017    RDW 12.9 2017     2017       CMP:   Lab Results   Component Value Date     2019    K 4.6 2019     2019 Population Health Chart Review & Patient Outreach Details      Additional [facility] Notes:               Updates Requested / Reviewed:      Updated Care Coordination Note, , and Immunizations Reconciliation Completed or Queried: [facility] Topics Overdue:      [facility] Score: 0     Patient is not due for any topics at this time.    Tetanus Vaccine                  Health Maintenance Topic(s) Outreach Outcomes & Actions Taken:    Eye Exam - Outreach Outcomes & Actions Taken  : External Records Requested & Care Team Updated if Applicable

## 2024-05-11 ENCOUNTER — HOSPITAL ENCOUNTER (INPATIENT)
Age: 84
LOS: 5 days | Discharge: HOME | DRG: 689 | End: 2024-05-16
Attending: STUDENT IN AN ORGANIZED HEALTH CARE EDUCATION/TRAINING PROGRAM | Admitting: INTERNAL MEDICINE
Payer: MEDICARE

## 2024-05-11 ENCOUNTER — APPOINTMENT (OUTPATIENT)
Dept: GENERAL RADIOLOGY | Age: 84
DRG: 689 | End: 2024-05-11
Payer: MEDICARE

## 2024-05-11 ENCOUNTER — APPOINTMENT (OUTPATIENT)
Dept: CT IMAGING | Age: 84
DRG: 689 | End: 2024-05-11
Payer: MEDICARE

## 2024-05-11 DIAGNOSIS — N32.89 BLADDER MASS: ICD-10-CM

## 2024-05-11 DIAGNOSIS — R62.7 FAILURE TO THRIVE IN ADULT: ICD-10-CM

## 2024-05-11 DIAGNOSIS — N30.00 ACUTE CYSTITIS WITHOUT HEMATURIA: Primary | ICD-10-CM

## 2024-05-11 PROBLEM — N39.0 UTI (URINARY TRACT INFECTION): Status: ACTIVE | Noted: 2024-05-11

## 2024-05-11 LAB
ALBUMIN SERPL-MCNC: 3.8 G/DL (ref 3.5–5.2)
ALP SERPL-CCNC: 86 U/L (ref 40–129)
ALT SERPL-CCNC: 14 U/L (ref 0–40)
ANION GAP SERPL CALCULATED.3IONS-SCNC: 12 MMOL/L (ref 7–16)
AST SERPL-CCNC: 17 U/L (ref 0–39)
BACTERIA URNS QL MICRO: ABNORMAL
BASOPHILS # BLD: 0.03 K/UL (ref 0–0.2)
BASOPHILS NFR BLD: 0 % (ref 0–2)
BILIRUB SERPL-MCNC: 0.6 MG/DL (ref 0–1.2)
BILIRUB UR QL STRIP: NEGATIVE
BNP SERPL-MCNC: 173 PG/ML (ref 0–450)
BUN SERPL-MCNC: 24 MG/DL (ref 6–23)
CALCIUM SERPL-MCNC: 8.6 MG/DL (ref 8.6–10.2)
CHLORIDE SERPL-SCNC: 103 MMOL/L (ref 98–107)
CLARITY UR: ABNORMAL
CO2 SERPL-SCNC: 21 MMOL/L (ref 22–29)
COLOR UR: YELLOW
CREAT SERPL-MCNC: 0.8 MG/DL (ref 0.7–1.2)
EOSINOPHIL # BLD: 0.11 K/UL (ref 0.05–0.5)
EOSINOPHILS RELATIVE PERCENT: 1 % (ref 0–6)
EPI CELLS #/AREA URNS HPF: ABNORMAL /HPF
ERYTHROCYTE [DISTWIDTH] IN BLOOD BY AUTOMATED COUNT: 13.1 % (ref 11.5–15)
GFR, ESTIMATED: 87 ML/MIN/1.73M2
GLUCOSE SERPL-MCNC: 86 MG/DL (ref 74–99)
GLUCOSE UR STRIP-MCNC: NEGATIVE MG/DL
HCT VFR BLD AUTO: 44.7 % (ref 37–54)
HGB BLD-MCNC: 15.2 G/DL (ref 12.5–16.5)
HGB UR QL STRIP.AUTO: ABNORMAL
IMM GRANULOCYTES # BLD AUTO: <0.03 K/UL (ref 0–0.58)
IMM GRANULOCYTES NFR BLD: 0 % (ref 0–5)
KETONES UR STRIP-MCNC: ABNORMAL MG/DL
LEUKOCYTE ESTERASE UR QL STRIP: ABNORMAL
LYMPHOCYTES NFR BLD: 1.83 K/UL (ref 1.5–4)
LYMPHOCYTES RELATIVE PERCENT: 24 % (ref 20–42)
MCH RBC QN AUTO: 31 PG (ref 26–35)
MCHC RBC AUTO-ENTMCNC: 34 G/DL (ref 32–34.5)
MCV RBC AUTO: 91 FL (ref 80–99.9)
MONOCYTES NFR BLD: 0.7 K/UL (ref 0.1–0.95)
MONOCYTES NFR BLD: 9 % (ref 2–12)
NEUTROPHILS NFR BLD: 65 % (ref 43–80)
NEUTS SEG NFR BLD: 5.11 K/UL (ref 1.8–7.3)
NITRITE UR QL STRIP: NEGATIVE
PH UR STRIP: 6 [PH] (ref 5–9)
PLATELET # BLD AUTO: 181 K/UL (ref 130–450)
PMV BLD AUTO: 10.5 FL (ref 7–12)
POTASSIUM SERPL-SCNC: 4.1 MMOL/L (ref 3.5–5)
PROT SERPL-MCNC: 6.1 G/DL (ref 6.4–8.3)
PROT UR STRIP-MCNC: ABNORMAL MG/DL
RBC # BLD AUTO: 4.91 M/UL (ref 3.8–5.8)
RBC #/AREA URNS HPF: ABNORMAL /HPF
SODIUM SERPL-SCNC: 136 MMOL/L (ref 132–146)
SP GR UR STRIP: 1.02 (ref 1–1.03)
TROPONIN I SERPL HS-MCNC: 53 NG/L (ref 0–11)
TROPONIN I SERPL HS-MCNC: 63 NG/L (ref 0–11)
UROBILINOGEN UR STRIP-ACNC: 0.2 EU/DL (ref 0–1)
WBC #/AREA URNS HPF: ABNORMAL /HPF
WBC OTHER # BLD: 7.8 K/UL (ref 4.5–11.5)

## 2024-05-11 PROCEDURE — 99223 1ST HOSP IP/OBS HIGH 75: CPT | Performed by: INTERNAL MEDICINE

## 2024-05-11 PROCEDURE — 2580000003 HC RX 258: Performed by: STUDENT IN AN ORGANIZED HEALTH CARE EDUCATION/TRAINING PROGRAM

## 2024-05-11 PROCEDURE — 81001 URINALYSIS AUTO W/SCOPE: CPT

## 2024-05-11 PROCEDURE — 71046 X-RAY EXAM CHEST 2 VIEWS: CPT

## 2024-05-11 PROCEDURE — 96374 THER/PROPH/DIAG INJ IV PUSH: CPT

## 2024-05-11 PROCEDURE — 6360000002 HC RX W HCPCS: Performed by: STUDENT IN AN ORGANIZED HEALTH CARE EDUCATION/TRAINING PROGRAM

## 2024-05-11 PROCEDURE — 99285 EMERGENCY DEPT VISIT HI MDM: CPT

## 2024-05-11 PROCEDURE — 84484 ASSAY OF TROPONIN QUANT: CPT

## 2024-05-11 PROCEDURE — 1200000000 HC SEMI PRIVATE

## 2024-05-11 PROCEDURE — 83880 ASSAY OF NATRIURETIC PEPTIDE: CPT

## 2024-05-11 PROCEDURE — 85025 COMPLETE CBC W/AUTO DIFF WBC: CPT

## 2024-05-11 PROCEDURE — 93005 ELECTROCARDIOGRAM TRACING: CPT | Performed by: STUDENT IN AN ORGANIZED HEALTH CARE EDUCATION/TRAINING PROGRAM

## 2024-05-11 PROCEDURE — 70450 CT HEAD/BRAIN W/O DYE: CPT

## 2024-05-11 PROCEDURE — 36415 COLL VENOUS BLD VENIPUNCTURE: CPT

## 2024-05-11 PROCEDURE — 80053 COMPREHEN METABOLIC PANEL: CPT

## 2024-05-11 RX ORDER — ONDANSETRON 2 MG/ML
4 INJECTION INTRAMUSCULAR; INTRAVENOUS EVERY 6 HOURS PRN
Status: DISCONTINUED | OUTPATIENT
Start: 2024-05-11 | End: 2024-05-16 | Stop reason: HOSPADM

## 2024-05-11 RX ORDER — POLYETHYLENE GLYCOL 3350 17 G/17G
17 POWDER, FOR SOLUTION ORAL DAILY PRN
Status: DISCONTINUED | OUTPATIENT
Start: 2024-05-11 | End: 2024-05-16 | Stop reason: HOSPADM

## 2024-05-11 RX ORDER — MAGNESIUM SULFATE IN WATER 40 MG/ML
2000 INJECTION, SOLUTION INTRAVENOUS PRN
Status: DISCONTINUED | OUTPATIENT
Start: 2024-05-11 | End: 2024-05-16 | Stop reason: HOSPADM

## 2024-05-11 RX ORDER — SODIUM CHLORIDE 9 MG/ML
INJECTION, SOLUTION INTRAVENOUS PRN
Status: DISCONTINUED | OUTPATIENT
Start: 2024-05-11 | End: 2024-05-16 | Stop reason: HOSPADM

## 2024-05-11 RX ORDER — POTASSIUM CHLORIDE 7.45 MG/ML
10 INJECTION INTRAVENOUS PRN
Status: DISCONTINUED | OUTPATIENT
Start: 2024-05-11 | End: 2024-05-16 | Stop reason: HOSPADM

## 2024-05-11 RX ORDER — ONDANSETRON 4 MG/1
4 TABLET, ORALLY DISINTEGRATING ORAL EVERY 8 HOURS PRN
Status: DISCONTINUED | OUTPATIENT
Start: 2024-05-11 | End: 2024-05-16 | Stop reason: HOSPADM

## 2024-05-11 RX ORDER — SODIUM CHLORIDE 0.9 % (FLUSH) 0.9 %
5-40 SYRINGE (ML) INJECTION EVERY 12 HOURS SCHEDULED
Status: DISCONTINUED | OUTPATIENT
Start: 2024-05-11 | End: 2024-05-16 | Stop reason: HOSPADM

## 2024-05-11 RX ORDER — ACETAMINOPHEN 325 MG/1
650 TABLET ORAL EVERY 6 HOURS PRN
Status: DISCONTINUED | OUTPATIENT
Start: 2024-05-11 | End: 2024-05-16 | Stop reason: HOSPADM

## 2024-05-11 RX ORDER — 0.9 % SODIUM CHLORIDE 0.9 %
1000 INTRAVENOUS SOLUTION INTRAVENOUS ONCE
Status: COMPLETED | OUTPATIENT
Start: 2024-05-11 | End: 2024-05-11

## 2024-05-11 RX ORDER — POTASSIUM CHLORIDE 20 MEQ/1
40 TABLET, EXTENDED RELEASE ORAL PRN
Status: DISCONTINUED | OUTPATIENT
Start: 2024-05-11 | End: 2024-05-16 | Stop reason: HOSPADM

## 2024-05-11 RX ORDER — ENOXAPARIN SODIUM 100 MG/ML
40 INJECTION SUBCUTANEOUS DAILY
Status: DISCONTINUED | OUTPATIENT
Start: 2024-05-11 | End: 2024-05-16 | Stop reason: HOSPADM

## 2024-05-11 RX ORDER — ACETAMINOPHEN 650 MG/1
650 SUPPOSITORY RECTAL EVERY 6 HOURS PRN
Status: DISCONTINUED | OUTPATIENT
Start: 2024-05-11 | End: 2024-05-16 | Stop reason: HOSPADM

## 2024-05-11 RX ORDER — SODIUM CHLORIDE 0.9 % (FLUSH) 0.9 %
5-40 SYRINGE (ML) INJECTION PRN
Status: DISCONTINUED | OUTPATIENT
Start: 2024-05-11 | End: 2024-05-16 | Stop reason: HOSPADM

## 2024-05-11 RX ORDER — LORAZEPAM 0.5 MG/1
0.5 TABLET ORAL EVERY 4 HOURS PRN
Status: DISCONTINUED | OUTPATIENT
Start: 2024-05-11 | End: 2024-05-16 | Stop reason: HOSPADM

## 2024-05-11 RX ADMIN — SODIUM CHLORIDE 1000 ML: 9 INJECTION, SOLUTION INTRAVENOUS at 17:18

## 2024-05-11 RX ADMIN — CEFTRIAXONE 1000 MG: 1 INJECTION, POWDER, FOR SOLUTION INTRAMUSCULAR; INTRAVENOUS at 17:17

## 2024-05-11 ASSESSMENT — PAIN SCALES - GENERAL: PAINLEVEL_OUTOF10: 0

## 2024-05-11 ASSESSMENT — LIFESTYLE VARIABLES
HOW MANY STANDARD DRINKS CONTAINING ALCOHOL DO YOU HAVE ON A TYPICAL DAY: PATIENT DOES NOT DRINK
HOW OFTEN DO YOU HAVE A DRINK CONTAINING ALCOHOL: NEVER

## 2024-05-11 NOTE — H&P
[facility]   History and Physical      CHIEF COMPLAINT:  weakness    History of Present Illness:  84 y.o. male with a history of hyperlipidemia and dementia presents with weakness.  Daughter and wife give history of weakness slowly getting worse over the last month.  He saw his PCP approximately a month and a half ago.  Initially they said that he began having back and neck pain worse than usual for the last month until they adjusted this situation and described that this has been bothering him for before his last PCP visit.  The same thing was that about his frequency initially it was a relatively new symptom for the last 10 to 15 days but then this seems to be a chronic issue.  It is hard to get a reliable history when I asked about his other medical problems the daughter and secretive way mouth the word dementia so as to not let her parents here her giving this history.  Afterwards the patient's wife freely spoke the word out loud.  They do not know the cause of his dementia but are able to tell me that it has been chronic.  They do recall discussing it with the PCP but do not recall any other aspects.  He has not been eating or walking as usual.  The ER find traces of UTI and his UA.  The daughter is interested in rehab since the patient is unable to care for himself      Informant(s) for H&P: ER doctor chart    REVIEW OF SYSTEMS:  no fevers, chills, cp, sob, n/v, ha, vision/hearing changes, wt changes, hot/cold flashes, other open skin lesions, diarrhea, constipation, dysuria/hematuria unless noted in HPI. Complete ROS performed with the patient and is otherwise negative.      PMH:  Past Medical History:   Diagnosis Date    Hyperlipidemia        Surgical History:  Past Surgical History:   Procedure Laterality Date    CHOLECYSTECTOMY      COLONOSCOPY  01/01/1990    neg    EYE SURGERY      HERNIA REPAIR      INTRACAPSULAR CATARACT EXTRACTION Left 06/29/2021    LEFT

## 2024-05-11 NOTE — ED PROVIDER NOTES
[facility] EMERGENCY DEPARTMENT  EMERGENCY DEPARTMENT ENCOUNTER    Pt Name: Nikita Archuleta  MRN: [id number]  Birthdate 1940  Date of evaluation: 5/11/2024  Provider: Dwain Mcwilliams MD  PCP: Chris Huang DO  Note Started: 2:35 PM EDT 5/11/24    HPI     Patient is a 84 y.o. male presents with a chief complaint of   Chief Complaint   Patient presents with    Chest Pain     Pt complaint of chest pain starting this morning   .    Patient presents with chest pain.  Patient denies any fevers or chills.  Patient stated that he just felt weak.  Patient is currently denying any chest pain.  Denies any nausea or vomiting.  No changes in urinary or bowel habits.  No abdominal pain.  Patient states that he is in no pain at this time.  Just feels weak.    Nursing Notes were all reviewed and agreed with or any disagreements were addressed in the HPI.    History From: Patient        Review of Systems   Pertinent positives and negatives as per HPI.     Physical Exam  Vitals and nursing note reviewed.   Constitutional:       Appearance: He is well-developed.   HENT:      Head: Normocephalic and atraumatic.   Eyes:      Conjunctiva/sclera: Conjunctivae normal.   Cardiovascular:      Rate and Rhythm: Normal rate and regular rhythm.      Heart sounds: Normal heart sounds. No murmur heard.  Pulmonary:      Effort: Pulmonary effort is normal. No respiratory distress.      Breath sounds: Normal breath sounds. No wheezing or rales.   Abdominal:      General: Bowel sounds are normal.      Palpations: Abdomen is soft.      Tenderness: There is no abdominal tenderness. There is no guarding or rebound.   Musculoskeletal:         General: No tenderness or deformity.      Cervical back: Normal range of motion and neck supple.   Skin:     General: Skin is warm and dry.   Neurological:      Mental Status: He

## 2024-05-12 LAB
BASOPHILS # BLD: 0.03 K/UL (ref 0–0.2)
BASOPHILS NFR BLD: 0 % (ref 0–2)
EKG ATRIAL RATE: 68 BPM
EKG P AXIS: 32 DEGREES
EKG P-R INTERVAL: 138 MS
EKG Q-T INTERVAL: 394 MS
EKG QRS DURATION: 90 MS
EKG QTC CALCULATION (BAZETT): 418 MS
EKG R AXIS: 18 DEGREES
EKG T AXIS: 80 DEGREES
EKG VENTRICULAR RATE: 68 BPM
EOSINOPHIL # BLD: 0.05 K/UL (ref 0.05–0.5)
EOSINOPHILS RELATIVE PERCENT: 1 % (ref 0–6)
ERYTHROCYTE [DISTWIDTH] IN BLOOD BY AUTOMATED COUNT: 12.9 % (ref 11.5–15)
HBA1C MFR BLD: 5.3 % (ref 4–5.6)
HCT VFR BLD AUTO: 45 % (ref 37–54)
HGB BLD-MCNC: 15.2 G/DL (ref 12.5–16.5)
IMM GRANULOCYTES # BLD AUTO: 0.03 K/UL (ref 0–0.58)
IMM GRANULOCYTES NFR BLD: 0 % (ref 0–5)
LYMPHOCYTES NFR BLD: 1.86 K/UL (ref 1.5–4)
LYMPHOCYTES RELATIVE PERCENT: 19 % (ref 20–42)
MCH RBC QN AUTO: 31.5 PG (ref 26–35)
MCHC RBC AUTO-ENTMCNC: 33.8 G/DL (ref 32–34.5)
MCV RBC AUTO: 93.4 FL (ref 80–99.9)
MONOCYTES NFR BLD: 0.69 K/UL (ref 0.1–0.95)
MONOCYTES NFR BLD: 7 % (ref 2–12)
NEUTROPHILS NFR BLD: 73 % (ref 43–80)
NEUTS SEG NFR BLD: 7.08 K/UL (ref 1.8–7.3)
PLATELET # BLD AUTO: 189 K/UL (ref 130–450)
PMV BLD AUTO: 10.4 FL (ref 7–12)
RBC # BLD AUTO: 4.82 M/UL (ref 3.8–5.8)
TSH SERPL DL<=0.05 MIU/L-ACNC: 1.55 UIU/ML (ref 0.27–4.2)
WBC OTHER # BLD: 9.7 K/UL (ref 4.5–11.5)

## 2024-05-12 PROCEDURE — 6360000002 HC RX W HCPCS: Performed by: INTERNAL MEDICINE

## 2024-05-12 PROCEDURE — 1200000000 HC SEMI PRIVATE

## 2024-05-12 PROCEDURE — 2580000003 HC RX 258: Performed by: INTERNAL MEDICINE

## 2024-05-12 PROCEDURE — 85025 COMPLETE CBC W/AUTO DIFF WBC: CPT

## 2024-05-12 PROCEDURE — 84443 ASSAY THYROID STIM HORMONE: CPT

## 2024-05-12 PROCEDURE — 83036 HEMOGLOBIN GLYCOSYLATED A1C: CPT

## 2024-05-12 PROCEDURE — 6370000000 HC RX 637 (ALT 250 FOR IP): Performed by: INTERNAL MEDICINE

## 2024-05-12 PROCEDURE — 36415 COLL VENOUS BLD VENIPUNCTURE: CPT

## 2024-05-12 PROCEDURE — 99232 SBSQ HOSP IP/OBS MODERATE 35: CPT | Performed by: INTERNAL MEDICINE

## 2024-05-12 PROCEDURE — 93010 ELECTROCARDIOGRAM REPORT: CPT | Performed by: INTERNAL MEDICINE

## 2024-05-12 RX ORDER — DIVALPROEX SODIUM 125 MG/1
125 CAPSULE, COATED PELLETS ORAL EVERY 8 HOURS SCHEDULED
Status: DISCONTINUED | OUTPATIENT
Start: 2024-05-12 | End: 2024-05-16 | Stop reason: HOSPADM

## 2024-05-12 RX ADMIN — LORAZEPAM 0.5 MG: 0.5 TABLET ORAL at 10:49

## 2024-05-12 RX ADMIN — DIVALPROEX SODIUM 125 MG: 125 CAPSULE, COATED PELLETS ORAL at 14:22

## 2024-05-12 RX ADMIN — SODIUM CHLORIDE, PRESERVATIVE FREE 10 ML: 5 INJECTION INTRAVENOUS at 08:45

## 2024-05-12 RX ADMIN — LORAZEPAM 0.5 MG: 0.5 TABLET ORAL at 22:28

## 2024-05-12 RX ADMIN — DIVALPROEX SODIUM 125 MG: 125 CAPSULE, COATED PELLETS ORAL at 22:28

## 2024-05-12 RX ADMIN — SODIUM CHLORIDE, PRESERVATIVE FREE 10 ML: 5 INJECTION INTRAVENOUS at 22:32

## 2024-05-12 RX ADMIN — ENOXAPARIN SODIUM 40 MG: 100 INJECTION SUBCUTANEOUS at 17:59

## 2024-05-12 RX ADMIN — LORAZEPAM 0.5 MG: 0.5 TABLET ORAL at 17:59

## 2024-05-12 ASSESSMENT — PAIN DESCRIPTION - FREQUENCY: FREQUENCY: INTERMITTENT

## 2024-05-12 ASSESSMENT — PAIN DESCRIPTION - PAIN TYPE: TYPE: CHRONIC PAIN

## 2024-05-12 ASSESSMENT — PAIN SCALES - GENERAL: PAINLEVEL_OUTOF10: 10

## 2024-05-12 ASSESSMENT — PAIN DESCRIPTION - DESCRIPTORS: DESCRIPTORS: ACHING

## 2024-05-12 ASSESSMENT — PAIN DESCRIPTION - ORIENTATION: ORIENTATION: POSTERIOR

## 2024-05-12 ASSESSMENT — PAIN DESCRIPTION - LOCATION: LOCATION: NECK

## 2024-05-12 NOTE — PLAN OF CARE
Problem: Pain  Goal: Verbalizes/displays adequate comfort level or baseline comfort level  5/12/2024 1620 by Maciej Richardson, RN  Outcome: Progressing     Problem: Skin/Tissue Integrity  Goal: Absence of new skin breakdown  Description: 1.  Monitor for areas of redness and/or skin breakdown  2.  Assess vascular access sites hourly  3.  Every 4-6 hours minimum:  Change oxygen saturation probe site  4.  Every 4-6 hours:  If on nasal continuous positive airway pressure, respiratory therapy assess nares and determine need for appliance change or resting period.  5/12/2024 1620 by Maciej Richardson, RN  Outcome: Progressing     Problem: Safety - Adult  Goal: Free from fall injury  5/12/2024 1620 by Maciej Richardson, RN  Outcome: Progressing

## 2024-05-12 NOTE — PLAN OF CARE
Problem: Pain  Goal: Verbalizes/displays adequate comfort level or baseline comfort level  5/12/2024 1106 by Jinny Jaquez RN  Outcome: Progressing     Problem: Skin/Tissue Integrity  Goal: Absence of new skin breakdown  Description: 1.  Monitor for areas of redness and/or skin breakdown  2.  Assess vascular access sites hourly  3.  Every 4-6 hours minimum:  Change oxygen saturation probe site  4.  Every 4-6 hours:  If on nasal continuous positive airway pressure, respiratory therapy assess nares and determine need for appliance change or resting period.  5/12/2024 1106 by Jinny Jaquez RN  Outcome: Progressing     Problem: Safety - Adult  Goal: Free from fall injury  5/12/2024 1106 by Jinny Jaquez RN  Outcome: Progressing     Problem: ABCDS Injury Assessment  Goal: Absence of physical injury  5/12/2024 1106 by Jinny Jaquez RN  Outcome: Progressing     Problem: Confusion  Goal: Confusion, delirium, dementia, or psychosis is improved or at baseline  Description: INTERVENTIONS:  1. Assess for possible contributors to thought disturbance, including medications, impaired vision or hearing, underlying metabolic abnormalities, dehydration, psychiatric diagnoses, and notify attending LIP  2. [place] high risk fall precautions, as indicated  3. Provide frequent short contacts to provide reality reorientation, refocusing and direction  4. Decrease environmental stimuli, including noise as appropriate  5. Monitor and intervene to maintain adequate nutrition, hydration, elimination, sleep and activity  6. If unable to ensure safety without constant attention obtain sitter and review sitter guidelines with assigned personnel  7. Initiate Psychosocial CNS and Spiritual Care consult, as indicated  5/12/2024 1106 by Jinny Jaquez RN  Outcome: Progressing

## 2024-05-12 NOTE — PLAN OF CARE
Problem: Pain  Goal: Verbalizes/displays adequate comfort level or baseline comfort level  Outcome: Progressing     Problem: Skin/Tissue Integrity  Goal: Absence of new skin breakdown  Description: 1.  Monitor for areas of redness and/or skin breakdown  2.  Assess vascular access sites hourly  3.  Every 4-6 hours minimum:  Change oxygen saturation probe site  4.  Every 4-6 hours:  If on nasal continuous positive airway pressure, respiratory therapy assess nares and determine need for appliance change or resting period.  Outcome: Progressing     Problem: Safety - Adult  Goal: Free from fall injury  Outcome: Progressing     Problem: ABCDS Injury Assessment  Goal: Absence of physical injury  Outcome: Progressing     Problem: Confusion  Goal: Confusion, delirium, dementia, or psychosis is improved or at baseline  Description: INTERVENTIONS:  1. Assess for possible contributors to thought disturbance, including medications, impaired vision or hearing, underlying metabolic abnormalities, dehydration, psychiatric diagnoses, and notify attending LIP  2. [place] high risk fall precautions, as indicated  3. Provide frequent short contacts to provide reality reorientation, refocusing and direction  4. Decrease environmental stimuli, including noise as appropriate  5. Monitor and intervene to maintain adequate nutrition, hydration, elimination, sleep and activity  6. If unable to ensure safety without constant attention obtain sitter and review sitter guidelines with assigned personnel  7. Initiate Psychosocial CNS and Spiritual Care consult, as indicated  Outcome: Progressing

## 2024-05-13 ENCOUNTER — APPOINTMENT (OUTPATIENT)
Dept: ULTRASOUND IMAGING | Age: 84
DRG: 689 | End: 2024-05-13
Payer: MEDICARE

## 2024-05-13 LAB
CHOLEST SERPL-MCNC: 156 MG/DL
HDLC SERPL-MCNC: 37 MG/DL
LDLC SERPL CALC-MCNC: 100 MG/DL
TRIGL SERPL-MCNC: 97 MG/DL
VLDLC SERPL CALC-MCNC: 19 MG/DL

## 2024-05-13 PROCEDURE — 36415 COLL VENOUS BLD VENIPUNCTURE: CPT

## 2024-05-13 PROCEDURE — 87086 URINE CULTURE/COLONY COUNT: CPT

## 2024-05-13 PROCEDURE — 99232 SBSQ HOSP IP/OBS MODERATE 35: CPT | Performed by: INTERNAL MEDICINE

## 2024-05-13 PROCEDURE — 80061 LIPID PANEL: CPT

## 2024-05-13 PROCEDURE — 6360000002 HC RX W HCPCS: Performed by: INTERNAL MEDICINE

## 2024-05-13 PROCEDURE — 2580000003 HC RX 258: Performed by: INTERNAL MEDICINE

## 2024-05-13 PROCEDURE — 87040 BLOOD CULTURE FOR BACTERIA: CPT

## 2024-05-13 PROCEDURE — 1200000000 HC SEMI PRIVATE

## 2024-05-13 PROCEDURE — 76770 US EXAM ABDO BACK WALL COMP: CPT

## 2024-05-13 PROCEDURE — 6370000000 HC RX 637 (ALT 250 FOR IP): Performed by: INTERNAL MEDICINE

## 2024-05-13 RX ORDER — HALOPERIDOL 5 MG/ML
1 INJECTION INTRAMUSCULAR EVERY 6 HOURS PRN
Status: DISCONTINUED | OUTPATIENT
Start: 2024-05-13 | End: 2024-05-16 | Stop reason: HOSPADM

## 2024-05-13 RX ADMIN — LORAZEPAM 0.5 MG: 0.5 TABLET ORAL at 11:58

## 2024-05-13 RX ADMIN — ENOXAPARIN SODIUM 40 MG: 100 INJECTION SUBCUTANEOUS at 17:16

## 2024-05-13 RX ADMIN — HALOPERIDOL LACTATE 1 MG: 5 INJECTION, SOLUTION INTRAMUSCULAR at 08:38

## 2024-05-13 RX ADMIN — SODIUM CHLORIDE, PRESERVATIVE FREE 10 ML: 5 INJECTION INTRAVENOUS at 09:18

## 2024-05-13 RX ADMIN — DIVALPROEX SODIUM 125 MG: 125 CAPSULE, COATED PELLETS ORAL at 14:47

## 2024-05-13 RX ADMIN — HALOPERIDOL LACTATE 1 MG: 5 INJECTION, SOLUTION INTRAMUSCULAR at 15:21

## 2024-05-13 RX ADMIN — CEFTRIAXONE 1000 MG: 1 INJECTION, POWDER, FOR SOLUTION INTRAMUSCULAR; INTRAVENOUS at 09:17

## 2024-05-13 RX ADMIN — DIVALPROEX SODIUM 125 MG: 125 CAPSULE, COATED PELLETS ORAL at 07:08

## 2024-05-13 NOTE — ACP (ADVANCE CARE PLANNING)
Advance Care Planning         The patient has appointed the following active healthcare agents:    Primary Decision Maker: Malina Archuleta M - Spouse - [phone]    Secondary Decision Maker: Ebony Glass Child - [phone]

## 2024-05-14 ENCOUNTER — APPOINTMENT (OUTPATIENT)
Dept: CT IMAGING | Age: 84
DRG: 689 | End: 2024-05-14
Payer: MEDICARE

## 2024-05-14 LAB
ALBUMIN SERPL-MCNC: 3.9 G/DL (ref 3.5–5.2)
ALP SERPL-CCNC: 96 U/L (ref 40–129)
ALT SERPL-CCNC: 24 U/L (ref 0–40)
ANION GAP SERPL CALCULATED.3IONS-SCNC: 16 MMOL/L (ref 7–16)
AST SERPL-CCNC: 45 U/L (ref 0–39)
BASOPHILS # BLD: 0.05 K/UL (ref 0–0.2)
BASOPHILS NFR BLD: 1 % (ref 0–2)
BILIRUB SERPL-MCNC: 0.8 MG/DL (ref 0–1.2)
BUN SERPL-MCNC: 29 MG/DL (ref 6–23)
CALCIUM SERPL-MCNC: 8.9 MG/DL (ref 8.6–10.2)
CHLORIDE SERPL-SCNC: 106 MMOL/L (ref 98–107)
CO2 SERPL-SCNC: 21 MMOL/L (ref 22–29)
CREAT SERPL-MCNC: 0.9 MG/DL (ref 0.7–1.2)
EOSINOPHIL # BLD: 0.12 K/UL (ref 0.05–0.5)
EOSINOPHILS RELATIVE PERCENT: 2 % (ref 0–6)
ERYTHROCYTE [DISTWIDTH] IN BLOOD BY AUTOMATED COUNT: 12.9 % (ref 11.5–15)
GFR, ESTIMATED: 81 ML/MIN/1.73M2
GLUCOSE BLD-MCNC: 104 MG/DL (ref 74–99)
GLUCOSE BLD-MCNC: 107 MG/DL (ref 74–99)
GLUCOSE SERPL-MCNC: 57 MG/DL (ref 74–99)
HCT VFR BLD AUTO: 48.2 % (ref 37–54)
HGB BLD-MCNC: 16.1 G/DL (ref 12.5–16.5)
IMM GRANULOCYTES # BLD AUTO: <0.03 K/UL (ref 0–0.58)
IMM GRANULOCYTES NFR BLD: 0 % (ref 0–5)
LYMPHOCYTES NFR BLD: 2.12 K/UL (ref 1.5–4)
LYMPHOCYTES RELATIVE PERCENT: 27 % (ref 20–42)
MCH RBC QN AUTO: 31.3 PG (ref 26–35)
MCHC RBC AUTO-ENTMCNC: 33.4 G/DL (ref 32–34.5)
MCV RBC AUTO: 93.8 FL (ref 80–99.9)
MONOCYTES NFR BLD: 0.7 K/UL (ref 0.1–0.95)
MONOCYTES NFR BLD: 9 % (ref 2–12)
NEUTROPHILS NFR BLD: 62 % (ref 43–80)
NEUTS SEG NFR BLD: 4.98 K/UL (ref 1.8–7.3)
PLATELET # BLD AUTO: 195 K/UL (ref 130–450)
PMV BLD AUTO: 10.5 FL (ref 7–12)
POTASSIUM SERPL-SCNC: 4.1 MMOL/L (ref 3.5–5)
PROT SERPL-MCNC: 6.4 G/DL (ref 6.4–8.3)
PSA SERPL-MCNC: 2.04 NG/ML (ref 0–4)
RBC # BLD AUTO: 5.14 M/UL (ref 3.8–5.8)
SODIUM SERPL-SCNC: 143 MMOL/L (ref 132–146)
WBC OTHER # BLD: 8 K/UL (ref 4.5–11.5)

## 2024-05-14 PROCEDURE — 99232 SBSQ HOSP IP/OBS MODERATE 35: CPT | Performed by: INTERNAL MEDICINE

## 2024-05-14 PROCEDURE — 2580000003 HC RX 258: Performed by: INTERNAL MEDICINE

## 2024-05-14 PROCEDURE — 85025 COMPLETE CBC W/AUTO DIFF WBC: CPT

## 2024-05-14 PROCEDURE — G0103 PSA SCREENING: HCPCS

## 2024-05-14 PROCEDURE — 97110 THERAPEUTIC EXERCISES: CPT

## 2024-05-14 PROCEDURE — 1200000000 HC SEMI PRIVATE

## 2024-05-14 PROCEDURE — 82962 GLUCOSE BLOOD TEST: CPT

## 2024-05-14 PROCEDURE — 97530 THERAPEUTIC ACTIVITIES: CPT

## 2024-05-14 PROCEDURE — 97165 OT EVAL LOW COMPLEX 30 MIN: CPT

## 2024-05-14 PROCEDURE — 97161 PT EVAL LOW COMPLEX 20 MIN: CPT

## 2024-05-14 PROCEDURE — 74176 CT ABD & PELVIS W/O CONTRAST: CPT

## 2024-05-14 PROCEDURE — 6360000002 HC RX W HCPCS: Performed by: INTERNAL MEDICINE

## 2024-05-14 PROCEDURE — 80053 COMPREHEN METABOLIC PANEL: CPT

## 2024-05-14 PROCEDURE — 88112 CYTOPATH CELL ENHANCE TECH: CPT

## 2024-05-14 PROCEDURE — 36415 COLL VENOUS BLD VENIPUNCTURE: CPT

## 2024-05-14 PROCEDURE — 6370000000 HC RX 637 (ALT 250 FOR IP): Performed by: INTERNAL MEDICINE

## 2024-05-14 RX ADMIN — CEFTRIAXONE 1000 MG: 1 INJECTION, POWDER, FOR SOLUTION INTRAMUSCULAR; INTRAVENOUS at 08:08

## 2024-05-14 RX ADMIN — DIVALPROEX SODIUM 125 MG: 125 CAPSULE, COATED PELLETS ORAL at 14:06

## 2024-05-14 RX ADMIN — DIVALPROEX SODIUM 125 MG: 125 CAPSULE, COATED PELLETS ORAL at 08:35

## 2024-05-14 RX ADMIN — SODIUM CHLORIDE, PRESERVATIVE FREE 10 ML: 5 INJECTION INTRAVENOUS at 08:09

## 2024-05-14 RX ADMIN — ENOXAPARIN SODIUM 40 MG: 100 INJECTION SUBCUTANEOUS at 19:00

## 2024-05-15 LAB
ALBUMIN SERPL-MCNC: 4.1 G/DL (ref 3.5–5.2)
ALP SERPL-CCNC: 106 U/L (ref 40–129)
ALT SERPL-CCNC: 30 U/L (ref 0–40)
ANION GAP SERPL CALCULATED.3IONS-SCNC: 17 MMOL/L (ref 7–16)
AST SERPL-CCNC: 36 U/L (ref 0–39)
BASOPHILS # BLD: 0.04 K/UL (ref 0–0.2)
BASOPHILS NFR BLD: 0 % (ref 0–2)
BILIRUB SERPL-MCNC: 0.9 MG/DL (ref 0–1.2)
BUN SERPL-MCNC: 25 MG/DL (ref 6–23)
CALCIUM SERPL-MCNC: 8.9 MG/DL (ref 8.6–10.2)
CHLORIDE SERPL-SCNC: 102 MMOL/L (ref 98–107)
CO2 SERPL-SCNC: 19 MMOL/L (ref 22–29)
CREAT SERPL-MCNC: 1 MG/DL (ref 0.7–1.2)
EOSINOPHIL # BLD: 0.12 K/UL (ref 0.05–0.5)
EOSINOPHILS RELATIVE PERCENT: 1 % (ref 0–6)
ERYTHROCYTE [DISTWIDTH] IN BLOOD BY AUTOMATED COUNT: 13.1 % (ref 11.5–15)
GFR, ESTIMATED: 77 ML/MIN/1.73M2
GLUCOSE SERPL-MCNC: 107 MG/DL (ref 74–99)
HCT VFR BLD AUTO: 50.3 % (ref 37–54)
HGB BLD-MCNC: 17 G/DL (ref 12.5–16.5)
IMM GRANULOCYTES # BLD AUTO: 0.04 K/UL (ref 0–0.58)
IMM GRANULOCYTES NFR BLD: 0 % (ref 0–5)
LYMPHOCYTES NFR BLD: 2.39 K/UL (ref 1.5–4)
LYMPHOCYTES RELATIVE PERCENT: 26 % (ref 20–42)
MCH RBC QN AUTO: 30.8 PG (ref 26–35)
MCHC RBC AUTO-ENTMCNC: 33.8 G/DL (ref 32–34.5)
MCV RBC AUTO: 91.1 FL (ref 80–99.9)
MICROORGANISM SPEC CULT: NO GROWTH
MONOCYTES NFR BLD: 0.87 K/UL (ref 0.1–0.95)
MONOCYTES NFR BLD: 9 % (ref 2–12)
NEUTROPHILS NFR BLD: 63 % (ref 43–80)
NEUTS SEG NFR BLD: 5.8 K/UL (ref 1.8–7.3)
PLATELET # BLD AUTO: 195 K/UL (ref 130–450)
PMV BLD AUTO: 11.1 FL (ref 7–12)
POTASSIUM SERPL-SCNC: 3.7 MMOL/L (ref 3.5–5)
PROT SERPL-MCNC: 7 G/DL (ref 6.4–8.3)
RBC # BLD AUTO: 5.52 M/UL (ref 3.8–5.8)
SODIUM SERPL-SCNC: 138 MMOL/L (ref 132–146)
SPECIMEN DESCRIPTION: NORMAL
WBC OTHER # BLD: 9.3 K/UL (ref 4.5–11.5)

## 2024-05-15 PROCEDURE — 2580000003 HC RX 258: Performed by: INTERNAL MEDICINE

## 2024-05-15 PROCEDURE — 6360000002 HC RX W HCPCS: Performed by: INTERNAL MEDICINE

## 2024-05-15 PROCEDURE — 85025 COMPLETE CBC W/AUTO DIFF WBC: CPT

## 2024-05-15 PROCEDURE — 36415 COLL VENOUS BLD VENIPUNCTURE: CPT

## 2024-05-15 PROCEDURE — 80053 COMPREHEN METABOLIC PANEL: CPT

## 2024-05-15 PROCEDURE — 6370000000 HC RX 637 (ALT 250 FOR IP): Performed by: INTERNAL MEDICINE

## 2024-05-15 PROCEDURE — 99232 SBSQ HOSP IP/OBS MODERATE 35: CPT | Performed by: INTERNAL MEDICINE

## 2024-05-15 PROCEDURE — 1200000000 HC SEMI PRIVATE

## 2024-05-15 RX ADMIN — DIVALPROEX SODIUM 125 MG: 125 CAPSULE, COATED PELLETS ORAL at 06:18

## 2024-05-15 RX ADMIN — CEFTRIAXONE 1000 MG: 1 INJECTION, POWDER, FOR SOLUTION INTRAMUSCULAR; INTRAVENOUS at 10:31

## 2024-05-15 RX ADMIN — DIVALPROEX SODIUM 125 MG: 125 CAPSULE, COATED PELLETS ORAL at 14:35

## 2024-05-15 RX ADMIN — SODIUM CHLORIDE, PRESERVATIVE FREE 10 ML: 5 INJECTION INTRAVENOUS at 22:07

## 2024-05-15 RX ADMIN — SODIUM CHLORIDE, PRESERVATIVE FREE 10 ML: 5 INJECTION INTRAVENOUS at 10:38

## 2024-05-15 RX ADMIN — DIVALPROEX SODIUM 125 MG: 125 CAPSULE, COATED PELLETS ORAL at 22:07

## 2024-05-15 RX ADMIN — ENOXAPARIN SODIUM 40 MG: 100 INJECTION SUBCUTANEOUS at 17:15

## 2024-05-15 RX ADMIN — HALOPERIDOL LACTATE 1 MG: 5 INJECTION, SOLUTION INTRAMUSCULAR at 06:17

## 2024-05-15 RX ADMIN — LORAZEPAM 0.5 MG: 0.5 TABLET ORAL at 10:31

## 2024-05-15 NOTE — DISCHARGE INSTRUCTIONS
Call upon discharge to schedule a follow-up visit with Helder Hastings/Tracie/Cassie ([facility]) at [phone]

## 2024-05-15 NOTE — CONSULTS
[place] [address]                              CONSULTATION      PATIENT NAME: TESSY JALLOH              : 1940  MED REC NO: [id number]                        ROOM: 0422  ACCOUNT NO: [id number]                       ADMIT DATE: 2024  PROVIDER: Paul Hodges MD      CHIEF COMPLAINT:  Abnormal ultrasound of the bladder.    HISTORY OF PRESENT ILLNESS:  This is an 84-year-old male who was admitted to the hospital on May 11, 2024, because of chest pain, was evaluated and was found to have an ultrasound of his abdomen which revealed an abnormal bladder mass.  He is scheduled to have a CAT scan.  The patient's serum creatinine is 0.9.  His history per his wife states that he has marked frequency and urgency of urination.  He does wear depends for urgency incontinence.  The patient has been becoming weaker over the past several months and he it is becoming difficult to care for him at home.    PAST MEDICAL HISTORY:  Hyperlipidemia.    PAST SURGICAL HISTORY:  He has had a cholecystectomy, colonoscopy, eye surgery, cataract extraction.    MEDICATIONS:  Currently consist of Haldol, Rocephin, Depakote, Klor-Con, Effer-K, Lovenox, Zofran, GlycoLax, Ativan.    SOCIAL HISTORY:  The patient has been a cigarette smoker and continues to do so.  He apparently does not use alcohol.    FAMILY HISTORY:  He is not aware of any male member of his family having urinary issues.    REVIEW OF SYSTEMS:  RESPIRATORY:  He has not had significant shortness of breath.  CARDIOVASCULAR:  He has been evaluated for chest pain.  GASTROINTESTINAL:  Weight has been slowly declining.  MENTAL STATUS:  The patient is becoming increasingly confused and has been thought to have dementia.    PHYSICAL EXAMINATION:  GENERAL:  The patient is alert.  His orientation is questionable to place and time.  ABDOMEN:  Soft.  I do not detect any bladder

## 2024-05-16 VITALS
HEIGHT: 72 IN | HEART RATE: 75 BPM | TEMPERATURE: 97.9 F | SYSTOLIC BLOOD PRESSURE: 144 MMHG | DIASTOLIC BLOOD PRESSURE: 67 MMHG | RESPIRATION RATE: 18 BRPM | WEIGHT: 165 LBS | OXYGEN SATURATION: 95 % | BODY MASS INDEX: 22.35 KG/M2

## 2024-05-16 LAB
ALBUMIN SERPL-MCNC: 3.8 G/DL (ref 3.5–5.2)
ALP SERPL-CCNC: 100 U/L (ref 40–129)
ALT SERPL-CCNC: 31 U/L (ref 0–40)
ANION GAP SERPL CALCULATED.3IONS-SCNC: 12 MMOL/L (ref 7–16)
AST SERPL-CCNC: 40 U/L (ref 0–39)
BASOPHILS # BLD: 0.03 K/UL (ref 0–0.2)
BASOPHILS NFR BLD: 0 % (ref 0–2)
BILIRUB SERPL-MCNC: 0.9 MG/DL (ref 0–1.2)
BUN SERPL-MCNC: 17 MG/DL (ref 6–23)
CALCIUM SERPL-MCNC: 9.2 MG/DL (ref 8.6–10.2)
CHLORIDE SERPL-SCNC: 102 MMOL/L (ref 98–107)
CO2 SERPL-SCNC: 27 MMOL/L (ref 22–29)
CREAT SERPL-MCNC: 0.9 MG/DL (ref 0.7–1.2)
EOSINOPHIL # BLD: 0.12 K/UL (ref 0.05–0.5)
EOSINOPHILS RELATIVE PERCENT: 2 % (ref 0–6)
ERYTHROCYTE [DISTWIDTH] IN BLOOD BY AUTOMATED COUNT: 13.1 % (ref 11.5–15)
GFR, ESTIMATED: 84 ML/MIN/1.73M2
GLUCOSE SERPL-MCNC: 82 MG/DL (ref 74–99)
HCT VFR BLD AUTO: 48.4 % (ref 37–54)
HGB BLD-MCNC: 16.5 G/DL (ref 12.5–16.5)
IMM GRANULOCYTES # BLD AUTO: <0.03 K/UL (ref 0–0.58)
IMM GRANULOCYTES NFR BLD: 0 % (ref 0–5)
LYMPHOCYTES NFR BLD: 2.14 K/UL (ref 1.5–4)
LYMPHOCYTES RELATIVE PERCENT: 29 % (ref 20–42)
MCH RBC QN AUTO: 31 PG (ref 26–35)
MCHC RBC AUTO-ENTMCNC: 34.1 G/DL (ref 32–34.5)
MCV RBC AUTO: 90.8 FL (ref 80–99.9)
MONOCYTES NFR BLD: 0.72 K/UL (ref 0.1–0.95)
MONOCYTES NFR BLD: 10 % (ref 2–12)
NEUTROPHILS NFR BLD: 59 % (ref 43–80)
NEUTS SEG NFR BLD: 4.3 K/UL (ref 1.8–7.3)
NON-GYN CYTOLOGY REPORT: NORMAL
PLATELET # BLD AUTO: 190 K/UL (ref 130–450)
PMV BLD AUTO: 10.6 FL (ref 7–12)
POTASSIUM SERPL-SCNC: 4 MMOL/L (ref 3.5–5)
PROT SERPL-MCNC: 6.5 G/DL (ref 6.4–8.3)
RBC # BLD AUTO: 5.33 M/UL (ref 3.8–5.8)
SODIUM SERPL-SCNC: 141 MMOL/L (ref 132–146)
WBC OTHER # BLD: 7.3 K/UL (ref 4.5–11.5)

## 2024-05-16 PROCEDURE — 6370000000 HC RX 637 (ALT 250 FOR IP): Performed by: INTERNAL MEDICINE

## 2024-05-16 PROCEDURE — 80053 COMPREHEN METABOLIC PANEL: CPT

## 2024-05-16 PROCEDURE — 36415 COLL VENOUS BLD VENIPUNCTURE: CPT

## 2024-05-16 PROCEDURE — 97110 THERAPEUTIC EXERCISES: CPT

## 2024-05-16 PROCEDURE — 2580000003 HC RX 258: Performed by: INTERNAL MEDICINE

## 2024-05-16 PROCEDURE — 6360000002 HC RX W HCPCS: Performed by: INTERNAL MEDICINE

## 2024-05-16 PROCEDURE — 85025 COMPLETE CBC W/AUTO DIFF WBC: CPT

## 2024-05-16 PROCEDURE — 97530 THERAPEUTIC ACTIVITIES: CPT

## 2024-05-16 PROCEDURE — 99239 HOSP IP/OBS DSCHRG MGMT >30: CPT | Performed by: INTERNAL MEDICINE

## 2024-05-16 RX ORDER — LORAZEPAM 2 MG/ML
1 INJECTION INTRAMUSCULAR ONCE
Status: COMPLETED | OUTPATIENT
Start: 2024-05-16 | End: 2024-05-16

## 2024-05-16 RX ORDER — POLYETHYLENE GLYCOL 3350 17 G/17G
17 POWDER, FOR SOLUTION ORAL DAILY PRN
Qty: 527 G | Refills: 0 | Status: SHIPPED | OUTPATIENT
Start: 2024-05-16 | End: 2024-06-15

## 2024-05-16 RX ORDER — CIPROFLOXACIN 250 MG/1
250 TABLET, FILM COATED ORAL 2 TIMES DAILY
Qty: 6 TABLET | Refills: 0 | Status: SHIPPED | OUTPATIENT
Start: 2024-05-16 | End: 2024-05-19

## 2024-05-16 RX ORDER — DIPHENHYDRAMINE HYDROCHLORIDE 50 MG/ML
50 INJECTION INTRAMUSCULAR; INTRAVENOUS ONCE
Status: COMPLETED | OUTPATIENT
Start: 2024-05-16 | End: 2024-05-16

## 2024-05-16 RX ORDER — DIVALPROEX SODIUM 125 MG/1
125 CAPSULE, COATED PELLETS ORAL EVERY 8 HOURS SCHEDULED
Qty: 90 CAPSULE | Refills: 1 | Status: SHIPPED | OUTPATIENT
Start: 2024-05-16

## 2024-05-16 RX ADMIN — DIVALPROEX SODIUM 125 MG: 125 CAPSULE, COATED PELLETS ORAL at 09:44

## 2024-05-16 RX ADMIN — CEFTRIAXONE 1000 MG: 1 INJECTION, POWDER, FOR SOLUTION INTRAMUSCULAR; INTRAVENOUS at 09:45

## 2024-05-16 RX ADMIN — SODIUM CHLORIDE, PRESERVATIVE FREE 10 ML: 5 INJECTION INTRAVENOUS at 09:45

## 2024-05-16 RX ADMIN — LORAZEPAM 0.5 MG: 0.5 TABLET ORAL at 04:00

## 2024-05-16 RX ADMIN — HALOPERIDOL LACTATE 1 MG: 5 INJECTION, SOLUTION INTRAMUSCULAR at 02:35

## 2024-05-16 RX ADMIN — LORAZEPAM 1 MG: 2 INJECTION INTRAMUSCULAR; INTRAVENOUS at 04:39

## 2024-05-16 RX ADMIN — DIPHENHYDRAMINE HYDROCHLORIDE 50 MG: 50 INJECTION INTRAMUSCULAR; INTRAVENOUS at 04:40

## 2024-05-16 NOTE — DISCHARGE SUMMARY
weight based adjustment of the mA/kV was utilized to reduce the radiation dose to as low as reasonably achievable. COMPARISON: None. HISTORY: ORDERING SYSTEM PROVIDED HISTORY: ams TECHNOLOGIST PROVIDED HISTORY: Reason for exam:->ams Has a \"code stroke\" or \"stroke alert\" been called?->No Decision Support Exception - unselect if not a suspected or confirmed emergency medical condition->Emergency Medical Condition (MA) FINDINGS: BRAIN/VENTRICLES: There is no acute intracranial hemorrhage, mass effect or midline shift.  No abnormal extra-axial fluid collection.  The gray-white differentiation is maintained without evidence of an acute infarct.  There is no evidence of hydrocephalus. The ventricles, cisterns and sulci are prominent consistent with atrophy.  There is decreased attenuation within the periventricular white matter consistent with periventricular microvascular ischemic disease. ORBITS: The visualized portion of the orbits demonstrate no acute abnormality. SINUSES: The visualized paranasal sinuses and mastoid air cells demonstrate no acute abnormality. SOFT TISSUES/SKULL:  No acute abnormality of the visualized skull or soft tissues.     1. There is no acute intracranial abnormality.  Specifically, there is no intracranial hemorrhage. 2. Atrophy and periventricular microvascular ischemic disease, .         Patient Instructions:   Discharge Medication List as of 5/16/2024  2:02 PM        START taking these medications    Details   divalproex (DEPAKOTE SPRINKLE) 125 MG DR capsule Take 1 capsule by mouth every 8 hours, Disp-90 capsule, R-1Normal      polyethylene glycol (GLYCOLAX) 17 g packet Take 1 packet by mouth daily as needed for Constipation, Disp-527 g, R-0Normal      ciprofloxacin (CIPRO) 250 MG tablet Take 1 tablet by mouth 2 times daily for 3 days, Disp-6 tablet, R-0Normal               Note that more than 30 minutes was spent in preparing discharge papers, discussing discharge with patient, medication

## 2024-05-16 NOTE — PLAN OF CARE
Problem: Pain  Goal: Verbalizes/displays adequate comfort level or baseline comfort level  5/16/2024 0450 by Sanjuana Walter RN  Outcome: Progressing  5/16/2024 0308 by Sanjuana Walter RN  Outcome: Progressing     Problem: Skin/Tissue Integrity  Goal: Absence of new skin breakdown  Description: 1.  Monitor for areas of redness and/or skin breakdown  2.  Assess vascular access sites hourly  3.  Every 4-6 hours minimum:  Change oxygen saturation probe site  4.  Every 4-6 hours:  If on nasal continuous positive airway pressure, respiratory therapy assess nares and determine need for appliance change or resting period.  5/16/2024 0450 by Sanjuana Walter RN  Outcome: Progressing  5/16/2024 0308 by Sanjuana Walter RN  Outcome: Progressing     Problem: Safety - Adult  Goal: Free from fall injury  5/16/2024 0450 by Sanjuana Walter RN  Outcome: Progressing  5/16/2024 0308 by Sanjuana Walter RN  Outcome: Progressing     Problem: ABCDS Injury Assessment  Goal: Absence of physical injury  5/16/2024 0450 by Sanjuana Walter RN  Outcome: Progressing  5/16/2024 0308 by Sanjuana Walter RN  Outcome: Progressing     Problem: Confusion  Goal: Confusion, delirium, dementia, or psychosis is improved or at baseline  Description: INTERVENTIONS:  1. Assess for possible contributors to thought disturbance, including medications, impaired vision or hearing, underlying metabolic abnormalities, dehydration, psychiatric diagnoses, and notify attending LIP  2. [place] high risk fall precautions, as indicated  3. Provide frequent short contacts to provide reality reorientation, refocusing and direction  4. Decrease environmental stimuli, including noise as appropriate  5. Monitor and intervene to maintain adequate nutrition, hydration, elimination, sleep and activity  6. If unable to ensure safety without constant attention obtain sitter and review sitter guidelines with assigned personnel  7. Initiate

## 2024-05-16 NOTE — PLAN OF CARE
Problem: Pain  Goal: Verbalizes/displays adequate comfort level or baseline comfort level  Outcome: Progressing     Problem: Skin/Tissue Integrity  Goal: Absence of new skin breakdown  Description: 1.  Monitor for areas of redness and/or skin breakdown  2.  Assess vascular access sites hourly  3.  Every 4-6 hours minimum:  Change oxygen saturation probe site  4.  Every 4-6 hours:  If on nasal continuous positive airway pressure, respiratory therapy assess nares and determine need for appliance change or resting period.  Outcome: Progressing     Problem: Safety - Adult  Goal: Free from fall injury  Outcome: Progressing     Problem: ABCDS Injury Assessment  Goal: Absence of physical injury  Outcome: Progressing     Problem: Confusion  Goal: Confusion, delirium, dementia, or psychosis is improved or at baseline  Description: INTERVENTIONS:  1. Assess for possible contributors to thought disturbance, including medications, impaired vision or hearing, underlying metabolic abnormalities, dehydration, psychiatric diagnoses, and notify attending LIP  2. [place] high risk fall precautions, as indicated  3. Provide frequent short contacts to provide reality reorientation, refocusing and direction  4. Decrease environmental stimuli, including noise as appropriate  5. Monitor and intervene to maintain adequate nutrition, hydration, elimination, sleep and activity  6. If unable to ensure safety without constant attention obtain sitter and review sitter guidelines with assigned personnel  7. Initiate Psychosocial CNS and Spiritual Care consult, as indicated  Outcome: Progressing

## 2024-05-16 NOTE — CARE COORDINATION
Ss note: 5/13/2024 1:11 PM pt with one on one sitter, therapy ordered. Met with wife and dtr Ebony Glass [phone] at length regarding transition of care plan. Provided and reviewed secure dementia units for skilled rehab, wife cannot care for pt at home at this time. Referrals made to 1. [facility] 2, [facility], 3 choice is [facility] (no dementia beds, no referral made.) Pt has medicare, requires 3 day inpt qualifying stay, Hens and signed JANEL. All questions answered, wife relays dtr will be primary contact and she will update pts wife. No hx of SNF. SHEBA Mccullough  
Ss note: 5/15/552488:50 AM  Pt continues with one on one sitter. Referral to [facility]  secure unit and liaison relays bed will be available tomorrow. NO PRECERT Will need Hens and signed JANEL.  SW will update pts dtr. SHEBA Mccullough    Ss note: 5/15/57261:06 PM Alli completed, dtr updated on bed available tomorrow at [facility]. SHEBA Mccullough  
Ss note: 5/16/202410:01 AM Pt continues with one on one sitter. Pt accepted at [facility] Skilled secure dementia unit. NO PRECERT. Hens completed. Arranged Physician ambulance transfer to facility today at 3:00 pm. Facility liaison, nursing aware, dtr is currently working and sw updated her via phone on transfer time (she and pts wife are very stressed.) Provided facility phone number to dtr. Will need signed JANEL. SHEBA Mccullough  
hx of SNF. NO precert but will need qualifying 3 day inpt hospital stay under medicare. Will need therapy notes, accepting SNF, Hens and signed JANEL for placement. SW will follow. SHEBA Mccullough    The Plan for Transition of Care is related to the following treatment goals of UTI (urinary tract infection) [N39.0]    The Plan for Transition of Care is related to the following treatment goals: anticipate SNF    The Patient and/or patient representative wife  was provided with a choice of provider and agrees   with the discharge plan. [x] Yes [] No    Freedom of choice list was provided with basic dialogue that supports the patient's individualized plan of care/goals, treatment preferences and shares the quality data associated with the providers. [x] Yes [] No

## 2024-05-16 NOTE — DISCHARGE INSTR - COC
Continuity of Care Form    Patient Name: Nikita Archuleta   :  1940  MRN:  [id number]    Admit date:  2024  Discharge date:      Code Status Order: Full Code   Advance Directives:     Admitting Physician:  Reed Stevenson MD  PCP: Chris Huang DO    Discharging Nurse: ***  Discharging Hospital Unit/Room#: 0438/0438-01  Discharging Unit Phone Number: ***    Emergency Contact:   Extended Emergency Contact Information  Primary Emergency Contact: Malina Archuleta  Address: [address] [place] [address]  Home Phone: [phone]  Relation: Spouse  Secondary Emergency Contact: Ebony Glass  Mobile Phone: [phone]  Relation: Child  Preferred language: English   needed? No    Past Surgical History:  Past Surgical History:   Procedure Laterality Date    CHOLECYSTECTOMY      COLONOSCOPY  1990    neg    EYE SURGERY      HERNIA REPAIR      INTRACAPSULAR CATARACT EXTRACTION Left 2021    LEFT CATARACT EXTRACTION WITH IOL performed by Atilio DAVILA MD at [facility]       Immunization History:   Immunization History   Administered Date(s) Administered    COVID-19, MODERNA BLUE border, Primary or Immunocompromised, (age 12y+), IM, 100 mcg/0.5mL 2021, 2021    COVID-19, MODERNA Booster BLUE border, (age 18y+), IM, 50mcg/0.25mL 2021       Active Problems:  Patient Active Problem List   Diagnosis Code    Left cataract H26.9    UTI (urinary tract infection) N39.0       Isolation/Infection:   Isolation            No Isolation          Patient Infection Status       None to display            Nurse Assessment:  Last Vital Signs: BP (!) 144/67   Pulse 75   Temp 97.9 °F (36.6 °C) (Oral)   Resp 18   Ht 1.829 m (6')   Wt 74.8 kg (165 lb)   SpO2 95%   BMI 22.38 kg/m²     Last documented pain score (0-10 scale): Pain Level: 10  Last Weight:   Wt Readings from Last 1 Encounters:   24 74.8 kg (165 lb)     Mental

## 2024-05-16 NOTE — PROGRESS NOTES
05/15/24 1132   Encounter Summary   Encounter Overview/Reason Spiritual/Emotional Needs   Service Provided For Patient   Referral/Consult From Rounding   Support System Spouse;Family members   Last Encounter  05/15/24  (L-DL)   Complexity of Encounter Moderate   Spiritual/Emotional needs   Type Spiritual Support   Assessment/Intervention/Outcome   Assessment Calm;Coping   Intervention Active listening;Explored/Affirmed feelings, thoughts, concerns;Prayer (assurance of)/[place];Nurtured Hope;Discussed belief system/Catholic practices/felicity;Discussed illness injury and it’s impact   Outcome Comfort;Engaged in conversation;Expressed feelings, needs, and concerns;Expressed Gratitude      visit with Nikita who has sitter at bedside. He states he has a wife at home with him. He is of the Jew felicity and prayers were said which he appreciated.  available for additional spiritual support as needed.     Tracie Hilton, [facility], [facility] Contact at Ext: [phone]  
  5/15/2024 10:49 AM  Nikita Archuleta  [id number]    Subjective:    Awake and alert, confused  Sitter at bedside    Review of Systems  Unable to obtain due to confusion      Scheduled Meds:   cefTRIAXone (ROCEPHIN) IV  1,000 mg IntraVENous Q24H    divalproex  125 mg Oral 3 times per day    sodium chloride flush  5-40 mL IntraVENous 2 times per day    enoxaparin  40 mg SubCUTAneous Daily       Objective:  Vitals:    05/14/24 1834   BP: 130/64   Pulse: 66   Resp:    Temp: 97.7 °F (36.5 °C)   SpO2: 97%         Allergies: Patient has no known allergies.    General Appearance: Awake and alert, confused  Skin: no rash or erythema  Head: normocephalic and atraumatic  Pulmonary/Chest: normal air movement, no respiratory distress  Abdomen: soft, non-tender, non-distended  Genitourinary: No Boyle  Extremities: no cyanosis, clubbing or edema         Labs:     Recent Labs     05/15/24  0636      K 3.7      CO2 19*   BUN 25*   CREATININE 1.0   GLUCOSE 107*   CALCIUM 8.9       Lab Results   Component Value Date/Time    HGB 17.0 05/15/2024 06:36 AM    HCT 50.3 05/15/2024 06:36 AM       Lab Results   Component Value Date    PSA 2.04 05/14/2024    PSA 1.53 08/21/2017     EXAMINATION:  CT OF THE ABDOMEN AND PELVIS WITHOUT CONTRAST 5/14/2024 5:12 pm     TECHNIQUE:  CT of the abdomen and pelvis was performed without the administration of  intravenous contrast. Multiplanar reformatted images are provided for review.  Automated exposure control, iterative reconstruction, and/or weight based  adjustment of the mA/kV was utilized to reduce the radiation dose to as low  as reasonably achievable.     COMPARISON:  None.     HISTORY:  ORDERING SYSTEM PROVIDED HISTORY: bladder mass  TECHNOLOGIST PROVIDED HISTORY:  Reason for exam:->bladder mass  Additional Contrast?->None     FINDINGS:  Lower Chest: Visualized lungs are normal.     Organs: Cholecystectomy.  The liver, spleen, adrenal glands, pancreas are  normal.  3.5 cm simple right 
4 Eyes Skin Assessment     NAME:  Nikita Archuleta  YOB: 1940  MEDICAL RECORD NUMBER:  [id number]    The patient is being assessed for  Admission    I agree that at least one RN has performed a thorough Head to Toe Skin Assessment on the patient. ALL assessment sites listed below have been assessed.      Areas assessed by both nurses:    Head, Face, Ears, Shoulders, Back, Chest, Arms, Elbows, Hands, Sacrum. Buttock, Coccyx, Ischium, Legs. Feet and Heels, and Under Medical Devices         Does the Patient have a Wound? No noted wound(s)       Parth Prevention initiated by RN: No  Wound Care Orders initiated by RN: No    Pressure Injury (Stage 3,4, Unstageable, DTI, NWPT, and Complex wounds) if present, place Wound referral order by RN under : No    New Ostomies, if present place, Ostomy referral order under : No     Nurse 1 eSignature: Electronically signed by Anjelica Fields RN on 5/12/24 at 12:42 AM EDT    **SHARE this note so that the co-signing nurse can place an eSignature**    Nurse 2 eSignature: Electronically signed by Wilton De La Rosa RN on 5/12/24 at 1:17 AM EDT  
Admitting Date and Time: 5/11/2024  2:24 PM  Admit Dx: UTI (urinary tract infection) [N39.0]    Subjective:    5/12 Pt feels well. Wants to go home, understands ths is not happening  Per RN: behavior disturbance    ROS: denies fever, chills, cp, sob, n/v, HA unless stated above.  5/13 , pt is laying in bed and doesn't follow commands and non verbal apparently ealier he was very combative and was given halodl and helped calm him down      cefTRIAXone (ROCEPHIN) IV  1,000 mg IntraVENous Q24H    divalproex  125 mg Oral 3 times per day    sodium chloride flush  5-40 mL IntraVENous 2 times per day    enoxaparin  40 mg SubCUTAneous Daily     haloperidol lactate, 1 mg, Q6H PRN  sodium chloride flush, 5-40 mL, PRN  sodium chloride, , PRN  potassium chloride, 40 mEq, PRN   Or  potassium alternative oral replacement, 40 mEq, PRN   Or  potassium chloride, 10 mEq, PRN  magnesium sulfate, 2,000 mg, PRN  ondansetron, 4 mg, Q8H PRN   Or  ondansetron, 4 mg, Q6H PRN  polyethylene glycol, 17 g, Daily PRN  acetaminophen, 650 mg, Q6H PRN   Or  acetaminophen, 650 mg, Q6H PRN  LORazepam, 0.5 mg, Q4H PRN         Objective:    BP (!) 178/74   Pulse 74   Temp 98.5 °F (36.9 °C) (Oral)   Resp 18   Ht 1.829 m (6')   Wt 74.8 kg (165 lb)   SpO2 95%   BMI 22.38 kg/m²   General Appearance:  very frail and laying in bed awake , but doesn't follow commands and non verbal   Head: normocephalic and atraumatic  Eyes: pupils equal, round, and reactive to light, extraocular eye movements intact, conjunctivae normal  Pulmonary/Chest: clear to auscultation bilaterally- no wheezes, rales or rhonchi, normal air movement, no respiratory distress  Cardiovascular: normal rate, normal S1 and S2 and no carotid bruits  Abdomen: soft, non-tender, non-distended, normal bowel sounds, no masses or organomegaly  Extremities: no cyanosis, no clubbing and no  edema  Neurologic unable to assess pt is not cooperative   Memory impaired      Recent Labs     
Admitting Date and Time: 5/11/2024  2:24 PM  Admit Dx: UTI (urinary tract infection) [N39.0]    Subjective:    5/12 Pt feels well. Wants to go home, understands ths is not happening  Per RN: behavior disturbance    ROS: denies fever, chills, cp, sob, n/v, HA unless stated above.  5/13 , pt is laying in bed and doesn't follow commands and non verbal apparently ealier he was very combative and was given halodl and helped calm him down     5/14 pt is more awake  today and  calm , has been eating and drinking well this morning cooperative      cefTRIAXone (ROCEPHIN) IV  1,000 mg IntraVENous Q24H    divalproex  125 mg Oral 3 times per day    sodium chloride flush  5-40 mL IntraVENous 2 times per day    enoxaparin  40 mg SubCUTAneous Daily     haloperidol lactate, 1 mg, Q6H PRN  sodium chloride flush, 5-40 mL, PRN  sodium chloride, , PRN  potassium chloride, 40 mEq, PRN   Or  potassium alternative oral replacement, 40 mEq, PRN   Or  potassium chloride, 10 mEq, PRN  magnesium sulfate, 2,000 mg, PRN  ondansetron, 4 mg, Q8H PRN   Or  ondansetron, 4 mg, Q6H PRN  polyethylene glycol, 17 g, Daily PRN  acetaminophen, 650 mg, Q6H PRN   Or  acetaminophen, 650 mg, Q6H PRN  LORazepam, 0.5 mg, Q4H PRN         Objective:    /64   Pulse 66   Temp 97.7 °F (36.5 °C) (Oral)   Resp 18   Ht 1.829 m (6')   Wt 74.8 kg (165 lb)   SpO2 97%   BMI 22.38 kg/m²   General Appearance:  very frail and laying in bed awake , disoriented  but cooperative   Head: normocephalic and atraumatic  Eyes: pupils equal, round, and reactive to light, extraocular eye movements intact, conjunctivae normal  Pulmonary/Chest: clear to auscultation bilaterally- no wheezes, rales or rhonchi, normal air movement, no respiratory distress  Cardiovascular: normal rate, normal S1 and S2 and no carotid bruits  Abdomen: soft, non-tender, non-distended, normal bowel sounds, no masses or organomegaly  Extremities: no cyanosis, no clubbing and no  edema  Neurologic 
Called patient's daughter to see if there is anyone that would be able to come and sit with the patient due to his dementia and impulsiveness.  She will come in for a little bit today.    
Patient combative  attempting to crawl over side rails of bed and kicking sitter with redirection.  Will not allow vital signs to be done.  
Physical Therapy  Physical Therapy    Room #:   0438/0438-01    Date: 5/15/2024       Patient Name: Nikita Archuleta  : 1940      MRN: [id number]     Patient unavailable for physical therapy treatment due to  patient has been agitated all day according to the 1:1 sitter . Will attempt PT treatment tomorrow. Thank you.      Doug Meyer  [facility]  LIC # [id number]        
This RN was called into pt's room multiple times because patient has been combative and hitting the 1:1 sitter in the room.  Pt was given haldol IM and ativan PO by charge RN.  After the ativan PO dose patient continued to hit the sitter in the room.  This RN called physician and received orders for IV ativan and IV benadryl and if those are not effective than to place patient in bilateral wrist soft restraints. Sanjuana Walter, RN    
Urine specimen obtained and sent to lab for analysis.  
techniques to improve independence/tolerance for self-care routine  * Functional transfer/mobility training/DME recommendations for increased independence, safety, and fall prevention  * Patient/Family education to increase follow through with safety techniques and functional independence  * Recommendation of environmental modifications for increased safety with functional transfers/mobility and ADLs  * Therapeutic exercise to improve motor endurance, ROM, and functional strength for ADLs/functional transfers  * Therapeutic activities to facilitate/challenge dynamic balance, stand tolerance for increased safety and independence with ADLs  * Positioning to improve skin integrity, interaction with environment and functional independence    Recommended Adaptive Equipment: TBD at rehab     Home Living: with spouse; single family home, 1 story, 1 step to enter, walk in shower.       Equipment owned: wheeled walker, pt unsure if he  has additional equipment     Prior Level of Function: pt states  he is independent with ADLs and IADLs; ambulated with  no device     Pain Level: no pain at time of evaluation; Nursing notified.      Cognition: A&O: 2/4; Follows 1 step directions   Memory: fair minus   Sequencing: fair minus   Problem solving: fair minus   Judgement/safety: fair minus    AMPAC   AM-PAC Daily Activity - Inpatient   How much help is needed for putting on and taking off regular lower body clothing?: A Lot  How much help is needed for bathing (which includes washing, rinsing, drying)?: A Lot  How much help is needed for toileting (which includes using toilet, bedpan, or urinal)?: A Little  How much help is needed for putting on and taking off regular upper body clothing?: A Little  How much help is needed for taking care of personal grooming?: A Little  How much help for eating meals?: None  [facility] Raw Score: 17  AM-PAC Inpatient ADL T-Scale Score : 37.26  ADL Inpatient CMS 0-100% Score: 
Mobility Raw Score : 15  AM-PAC Inpatient T-Scale Score : 39.45  Mobility Inpatient CMS 0-100% Score: 57.7  Mobility Inpatient CMS G-Code Modifier : CK    Nursing cleared patient for PT evaluation. The admitting diagnosis and active problem list as listed above have been reviewed prior to the initiation of this evaluation.    OBJECTIVE:   Initial Evaluation  Date: 5/14/2024 Treatment Date:     Short Term/ Long Term   Goals   Was pt agreeable to Eval/treatment? Yes  To be met in 3 days   Pain level   0/10       Bed Mobility  Using rails and head of bed elevated:     Rolling: Minimal assist of 1    Supine to sit: Moderate assist of 1    Sit to supine: Moderate assist of 1    Scooting: Moderate assist of 1    Rolling: Independent    Supine to sit: Independent    Sit to supine: Independent    Scooting: Independent     Transfers Sit to stand: Minimal assist of 1 from EOB  Sit to stand: Independent    Ambulation     15 feet using  hand held assist with Moderate assist of 1   for balance, upright, and safety and Patient with shuffling steps, flexed posture, decreased morgan, decreased base of support, and decreased step length    50 feet using  wheeled walker with Supervision     Stair negotiation: ascended and descended   Not assessed         ROM Decreased knee extension   Increase range of motion 10% of affected joints    Strength BUE:  refer to OT eval  RLE:  3+/5  LLE:  3+/5  Increase strength in affected mm groups by 1/3 grade   Balance Sitting EOB:  fair +  Dynamic Standing:  fair - no device   Sitting EOB:  good   Dynamic Standing: fair +     Patient is Alert & Oriented x person and follows one step directions    Sensation:  Patient  denies numbness/tingling   Edema:  no   Endurance: fair      Vitals: room air   Blood Pressure at rest  Blood Pressure during session    Heart Rate at rest  Heart Rate during session    SPO2 at rest %  SPO2 during session %     Patient education  Patient educated on role of Physical 
been sent so we will complete an empiric course of IV while here but switch to p.o. to complete 7 days.  Dementia with behavioral disturbance at times.  He seems to have a tendency to be aggressive.  I did better seem to be afraid to talk  out loud in front of him when I admitted him yesterday.  Encouraged nurse to give him as needed Ativan and I will also start standing order f exception of Tach or Depakote sprinkles  Hyperlipidemia: not on rx.  Check lipid panel in am.  Will also check A1c and tsh  Full Code   DVT prophylaxis: lovenox  Disposition rehab placement    NOTE: This report was transcribed using voice recognition software. Every effort was made to ensure accuracy; however, inadvertent computerized transcription errors may be present.     Electronically signed by LAURA PACHECO MD on 5/12/2024 at 11:20 AM    
the last month until they adjusted this situation and described that this has been bothering him for before his last PCP visit.  The same thing was that about his frequency initially it was a relatively new symptom for the last 10 to 15 days but then this seems to be a chronic issue.  It is hard to get a reliable history when I asked about his other medical problems the daughter and secretive way mouth the word dementia so as to not let her parents here her giving this history.  Afterwards the patient's wife freely spoke the word out loud.  They do not know the cause of his dementia but are able to tell me that it has been chronic.  They do recall discussing it with the PCP but do not recall any other aspects.  He has not been eating or walking as usual.  The ER find traces of UTI and his UA.  The daughter is interested in rehab since the patient is unable to care for himself     Metabolic encephalopathy.  Blood and urine  culture has been sent continue empiric course of IV  rocephin while here but switch to p.o. 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  He seems to have a tendency to be aggressive.   on haldol IM and has been helping , will need rehab  with dementia unit  await bed and precert   Hyperlipidemia: not on rx.   Lipid profile is good ,  .  Hbaic 5.3, tsh within normal   Full Code   DVT prophylaxis: lovenox  Disposition rehab placement, await bed , probably tomorrow     NOTE: This report was transcribed using voice recognition software. Every effort was made to ensure accuracy; however, inadvertent computerized transcription errors may be present.     Electronically signed by Yamila Camp MD on 5/15/2024 at 5:12 PM  
codes:    Therapeutic activities (79138)   13 minutes  1 unit(s)  Therapeutic exercises (74763)   10 minutes  1 unit(s)    Doug Meyer  [facility]  LIC # [id number]

## 2024-05-17 ENCOUNTER — APPOINTMENT (OUTPATIENT)
Dept: GENERAL RADIOLOGY | Age: 84
End: 2024-05-17
Payer: MEDICARE

## 2024-05-17 ENCOUNTER — APPOINTMENT (OUTPATIENT)
Dept: CT IMAGING | Age: 84
End: 2024-05-17
Payer: MEDICARE

## 2024-05-17 ENCOUNTER — HOSPITAL ENCOUNTER (EMERGENCY)
Age: 84
Discharge: HOME | End: 2024-05-17
Attending: EMERGENCY MEDICINE
Payer: MEDICARE

## 2024-05-17 VITALS
OXYGEN SATURATION: 95 % | RESPIRATION RATE: 16 BRPM | SYSTOLIC BLOOD PRESSURE: 170 MMHG | DIASTOLIC BLOOD PRESSURE: 86 MMHG | TEMPERATURE: 98 F | HEART RATE: 62 BPM

## 2024-05-17 DIAGNOSIS — S00.93XA CONTUSION OF HEAD, UNSPECIFIED PART OF HEAD, INITIAL ENCOUNTER: ICD-10-CM
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
## 2024-05-17 PROCEDURE — 70450 CT HEAD/BRAIN W/O DYE: CPT

## 2024-05-17 PROCEDURE — 73030 X-RAY EXAM OF SHOULDER: CPT

## 2024-05-17 PROCEDURE — 71045 X-RAY EXAM CHEST 1 VIEW: CPT

## 2024-05-17 PROCEDURE — 99284 EMERGENCY DEPT VISIT MOD MDM: CPT

## 2024-05-17 PROCEDURE — 72125 CT NECK SPINE W/O DYE: CPT

## 2024-05-17 PROCEDURE — 72170 X-RAY EXAM OF PELVIS: CPT

## 2024-05-17 ASSESSMENT — PAIN - FUNCTIONAL ASSESSMENT: PAIN_FUNCTIONAL_ASSESSMENT: 0-10

## 2024-05-17 ASSESSMENT — PAIN SCALES - GENERAL: PAINLEVEL_OUTOF10: 3

## 2024-05-17 ASSESSMENT — PAIN DESCRIPTION - PAIN TYPE: TYPE: ACUTE PAIN

## 2024-05-17 NOTE — ED PROVIDER NOTES
Bladder: 4.0 cm round foreign body like lesion noted within the urinary bladder..  No significant post void residual.     1. 4.0 cm round nonshadowing lesion noted within the urinary bladder. 2. 4.0 cm simple right renal cyst.     XR CHEST (2 VW)    Result Date: 5/11/2024  EXAMINATION: TWO XRAY VIEWS OF THE CHEST 5/11/2024 3:22 pm COMPARISON: 02/23/2018 HISTORY: ORDERING SYSTEM PROVIDED HISTORY: shortness of breath TECHNOLOGIST PROVIDED HISTORY: Reason for exam:->shortness of breath FINDINGS: Normal heart size.  Increased retrosternal airspace and mild hyperinflation. No acute pulmonary infiltrate.  No vascular congestion or pleural effusion. No pneumothorax.  Atherosclerotic thoracic aorta.     1.  No pneumonia or acute cardiopulmonary disease. 2.  Mild hyperinflation.     CT HEAD WO CONTRAST    Result Date: 5/11/2024  EXAMINATION: CT OF THE HEAD WITHOUT CONTRAST  5/11/2024 3:16 pm TECHNIQUE: CT of the head was performed without the administration of intravenous contrast. Automated exposure control, iterative reconstruction, and/or weight based adjustment of the mA/kV was utilized to reduce the radiation dose to as low as reasonably achievable. COMPARISON: None. HISTORY: ORDERING SYSTEM PROVIDED HISTORY: ams TECHNOLOGIST PROVIDED HISTORY: Reason for exam:->ams Has a \"code stroke\" or \"stroke alert\" been called?->No Decision Support Exception - unselect if not a suspected or confirmed emergency medical condition->Emergency Medical Condition (MA) FINDINGS: BRAIN/VENTRICLES: There is no acute intracranial hemorrhage, mass effect or midline shift.  No abnormal extra-axial fluid collection.  The gray-white differentiation is maintained without evidence of an acute infarct.  There is no evidence of hydrocephalus. The ventricles, cisterns and sulci are prominent consistent with atrophy.  There is decreased attenuation within the periventricular white matter consistent with periventricular microvascular ischemic disease.

## 2024-05-19 LAB
MICROORGANISM SPEC CULT: NORMAL
MICROORGANISM SPEC CULT: NORMAL
SERVICE CMNT-IMP: NORMAL
SERVICE CMNT-IMP: NORMAL
SPECIMEN DESCRIPTION: NORMAL
SPECIMEN DESCRIPTION: NORMAL

## 2024-05-26 ENCOUNTER — APPOINTMENT (OUTPATIENT)
Dept: CT IMAGING | Age: 84
End: 2024-05-26
Payer: MEDICARE

## 2024-05-26 ENCOUNTER — HOSPITAL ENCOUNTER (INPATIENT)
Age: 84
LOS: 5 days | Discharge: HOSPICE | End: 2024-06-01
Attending: EMERGENCY MEDICINE | Admitting: FAMILY MEDICINE
Payer: MEDICARE

## 2024-05-26 DIAGNOSIS — J18.9 PNEUMONIA OF LEFT LOWER LOBE DUE TO INFECTIOUS ORGANISM: ICD-10-CM

## 2024-05-26 DIAGNOSIS — A41.9 SEPSIS WITH ACUTE RENAL FAILURE WITHOUT SEPTIC SHOCK, DUE TO UNSPECIFIED ORGANISM, UNSPECIFIED ACUTE RENAL FAILURE TYPE (HCC): ICD-10-CM

## 2024-05-26 DIAGNOSIS — K92.2 GASTROINTESTINAL HEMORRHAGE, UNSPECIFIED GASTROINTESTINAL HEMORRHAGE TYPE: Primary | ICD-10-CM

## 2024-05-26 DIAGNOSIS — N32.89 BLADDER MASS: ICD-10-CM

## 2024-05-26 DIAGNOSIS — I26.93 SINGLE SUBSEGMENTAL PULMONARY EMBOLISM WITHOUT ACUTE COR PULMONALE (HCC): ICD-10-CM

## 2024-05-26 DIAGNOSIS — N17.9 SEPSIS WITH ACUTE RENAL FAILURE WITHOUT SEPTIC SHOCK, DUE TO UNSPECIFIED ORGANISM, UNSPECIFIED ACUTE RENAL FAILURE TYPE (HCC): ICD-10-CM

## 2024-05-26 DIAGNOSIS — N32.0 BLADDER OUTLET OBSTRUCTION: ICD-10-CM

## 2024-05-26 DIAGNOSIS — K92.1 GASTROINTESTINAL HEMORRHAGE WITH MELENA: ICD-10-CM

## 2024-05-26 DIAGNOSIS — R65.20 SEPSIS WITH ACUTE RENAL FAILURE WITHOUT SEPTIC SHOCK, DUE TO UNSPECIFIED ORGANISM, UNSPECIFIED ACUTE RENAL FAILURE TYPE (HCC): ICD-10-CM

## 2024-05-26 LAB
ABO + RH BLD: NORMAL
ALBUMIN SERPL-MCNC: 3.7 G/DL (ref 3.5–5.2)
ALP SERPL-CCNC: 95 U/L (ref 40–129)
ALT SERPL-CCNC: 58 U/L (ref 0–40)
ANION GAP SERPL CALCULATED.3IONS-SCNC: 16 MMOL/L (ref 7–16)
ARM BAND NUMBER: NORMAL
AST SERPL-CCNC: 67 U/L (ref 0–39)
BASOPHILS # BLD: 0 K/UL (ref 0–0.2)
BASOPHILS NFR BLD: 0 % (ref 0–2)
BILIRUB SERPL-MCNC: 0.4 MG/DL (ref 0–1.2)
BLOOD BANK SAMPLE EXPIRATION: NORMAL
BLOOD GROUP ANTIBODIES SERPL: NEGATIVE
BUN SERPL-MCNC: 80 MG/DL (ref 6–23)
CALCIUM SERPL-MCNC: 9.9 MG/DL (ref 8.6–10.2)
CHLORIDE SERPL-SCNC: 109 MMOL/L (ref 98–107)
CO2 SERPL-SCNC: 25 MMOL/L (ref 22–29)
CREAT SERPL-MCNC: 1.4 MG/DL (ref 0.7–1.2)
DATE LAST DOSE: ABNORMAL
EOSINOPHIL # BLD: 0.14 K/UL (ref 0.05–0.5)
EOSINOPHILS RELATIVE PERCENT: 1 % (ref 0–6)
ERYTHROCYTE [DISTWIDTH] IN BLOOD BY AUTOMATED COUNT: 13.3 % (ref 11.5–15)
GFR, ESTIMATED: 49 ML/MIN/1.73M2
GLUCOSE SERPL-MCNC: 112 MG/DL (ref 74–99)
HCT VFR BLD AUTO: 50.3 % (ref 37–54)
HGB BLD-MCNC: 16.7 G/DL (ref 12.5–16.5)
LACTATE BLDV-SCNC: 4.2 MMOL/L (ref 0.5–2.2)
LIPASE SERPL-CCNC: 42 U/L (ref 13–60)
LYMPHOCYTES NFR BLD: 2.25 K/UL (ref 1.5–4)
LYMPHOCYTES RELATIVE PERCENT: 14 % (ref 20–42)
MCH RBC QN AUTO: 31.6 PG (ref 26–35)
MCHC RBC AUTO-ENTMCNC: 33.2 G/DL (ref 32–34.5)
MCV RBC AUTO: 95.1 FL (ref 80–99.9)
MONOCYTES NFR BLD: 1.97 K/UL (ref 0.1–0.95)
MONOCYTES NFR BLD: 12 % (ref 2–12)
NEUTROPHILS NFR BLD: 73 % (ref 43–80)
NEUTS SEG NFR BLD: 11.94 K/UL (ref 1.8–7.3)
PARTIAL THROMBOPLASTIN TIME: 28.6 SEC (ref 24.5–35.1)
PLATELET # BLD AUTO: 250 K/UL (ref 130–450)
PMV BLD AUTO: 11.2 FL (ref 7–12)
POTASSIUM SERPL-SCNC: 4 MMOL/L (ref 3.5–5)
PROT SERPL-MCNC: 7.1 G/DL (ref 6.4–8.3)
RBC # BLD AUTO: 5.29 M/UL (ref 3.8–5.8)
RBC # BLD: NORMAL 10*6/UL
SODIUM SERPL-SCNC: 150 MMOL/L (ref 132–146)
TME LAST DOSE: ABNORMAL H
TROPONIN I SERPL HS-MCNC: 87 NG/L (ref 0–11)
VALPROATE SERPL-MCNC: 34 UG/ML (ref 50–100)
VANCOMYCIN DOSE: ABNORMAL MG
WBC OTHER # BLD: 16.3 K/UL (ref 4.5–11.5)

## 2024-05-26 PROCEDURE — 2580000003 HC RX 258: Performed by: EMERGENCY MEDICINE

## 2024-05-26 PROCEDURE — 99285 EMERGENCY DEPT VISIT HI MDM: CPT

## 2024-05-26 PROCEDURE — 80053 COMPREHEN METABOLIC PANEL: CPT

## 2024-05-26 PROCEDURE — 71275 CT ANGIOGRAPHY CHEST: CPT

## 2024-05-26 PROCEDURE — 86900 BLOOD TYPING SEROLOGIC ABO: CPT

## 2024-05-26 PROCEDURE — 6360000002 HC RX W HCPCS: Performed by: EMERGENCY MEDICINE

## 2024-05-26 PROCEDURE — 85025 COMPLETE CBC W/AUTO DIFF WBC: CPT

## 2024-05-26 PROCEDURE — 85730 THROMBOPLASTIN TIME PARTIAL: CPT

## 2024-05-26 PROCEDURE — 96361 HYDRATE IV INFUSION ADD-ON: CPT

## 2024-05-26 PROCEDURE — 74177 CT ABD & PELVIS W/CONTRAST: CPT

## 2024-05-26 PROCEDURE — 96372 THER/PROPH/DIAG INJ SC/IM: CPT

## 2024-05-26 PROCEDURE — C9113 INJ PANTOPRAZOLE SODIUM, VIA: HCPCS | Performed by: EMERGENCY MEDICINE

## 2024-05-26 PROCEDURE — 83605 ASSAY OF LACTIC ACID: CPT

## 2024-05-26 PROCEDURE — 86850 RBC ANTIBODY SCREEN: CPT

## 2024-05-26 PROCEDURE — 84484 ASSAY OF TROPONIN QUANT: CPT

## 2024-05-26 PROCEDURE — 83690 ASSAY OF LIPASE: CPT

## 2024-05-26 PROCEDURE — 86901 BLOOD TYPING SEROLOGIC RH(D): CPT

## 2024-05-26 PROCEDURE — 96374 THER/PROPH/DIAG INJ IV PUSH: CPT

## 2024-05-26 PROCEDURE — 36415 COLL VENOUS BLD VENIPUNCTURE: CPT

## 2024-05-26 PROCEDURE — 93005 ELECTROCARDIOGRAM TRACING: CPT | Performed by: EMERGENCY MEDICINE

## 2024-05-26 PROCEDURE — 6360000004 HC RX CONTRAST MEDICATION: Performed by: RADIOLOGY

## 2024-05-26 PROCEDURE — 80164 ASSAY DIPROPYLACETIC ACD TOT: CPT

## 2024-05-26 RX ORDER — 0.9 % SODIUM CHLORIDE 0.9 %
500 INTRAVENOUS SOLUTION INTRAVENOUS ONCE
Status: COMPLETED | OUTPATIENT
Start: 2024-05-26 | End: 2024-05-26

## 2024-05-26 RX ORDER — PANTOPRAZOLE SODIUM 40 MG/10ML
40 INJECTION, POWDER, LYOPHILIZED, FOR SOLUTION INTRAVENOUS ONCE
Status: COMPLETED | OUTPATIENT
Start: 2024-05-26 | End: 2024-05-26

## 2024-05-26 RX ORDER — 0.9 % SODIUM CHLORIDE 0.9 %
1000 INTRAVENOUS SOLUTION INTRAVENOUS ONCE
Status: COMPLETED | OUTPATIENT
Start: 2024-05-26 | End: 2024-05-27

## 2024-05-26 RX ORDER — HALOPERIDOL 5 MG/ML
2 INJECTION INTRAMUSCULAR ONCE
Status: COMPLETED | OUTPATIENT
Start: 2024-05-26 | End: 2024-05-26

## 2024-05-26 RX ORDER — ZIPRASIDONE MESYLATE 20 MG/ML
10 INJECTION, POWDER, LYOPHILIZED, FOR SOLUTION INTRAMUSCULAR ONCE
Status: COMPLETED | OUTPATIENT
Start: 2024-05-26 | End: 2024-05-26

## 2024-05-26 RX ORDER — HALOPERIDOL 2 MG/1
2 TABLET ORAL ONCE
Status: DISCONTINUED | OUTPATIENT
Start: 2024-05-26 | End: 2024-05-26

## 2024-05-26 RX ORDER — DIVALPROEX SODIUM 125 MG/1
125 CAPSULE, COATED PELLETS ORAL ONCE
Status: DISCONTINUED | OUTPATIENT
Start: 2024-05-26 | End: 2024-05-26

## 2024-05-26 RX ADMIN — IOPAMIDOL 75 ML: 755 INJECTION, SOLUTION INTRAVENOUS at 22:30

## 2024-05-26 RX ADMIN — HALOPERIDOL LACTATE 2 MG: 5 INJECTION, SOLUTION INTRAMUSCULAR at 19:01

## 2024-05-26 RX ADMIN — ZIPRASIDONE MESYLATE 10 MG: 20 INJECTION, POWDER, LYOPHILIZED, FOR SOLUTION INTRAMUSCULAR at 20:07

## 2024-05-26 RX ADMIN — SODIUM CHLORIDE 500 ML: 9 INJECTION, SOLUTION INTRAVENOUS at 17:42

## 2024-05-26 RX ADMIN — PANTOPRAZOLE SODIUM 40 MG: 40 INJECTION, POWDER, FOR SOLUTION INTRAVENOUS at 17:41

## 2024-05-26 RX ADMIN — SODIUM CHLORIDE 1000 ML: 9 INJECTION, SOLUTION INTRAVENOUS at 20:59

## 2024-05-26 RX ADMIN — IOPAMIDOL 75 ML: 755 INJECTION, SOLUTION INTRAVENOUS at 20:28

## 2024-05-26 NOTE — ED NOTES
Radiology Procedure Waiver   Name: Nikita Archuleta  : 1940  MRN: [id number]    Date:  24    Time: 7:58 PM EDT    Benefits of immediately proceeding with Radiology exam(s) without pre-testing outweigh the risks or are not indicated as specified below and therefore the following is/are being waived:    [] Pregnancy test   [] Patients LMP on-time and regular.   [] Patient had Tubal Ligation or has other Contraception Device.   [] Patient  is Menopausal or Premenarcheal.    [] Patient had Full or Partial Hysterectomy.    [] Protocol for Iodine allergy    [] MRI Questionnaire     [] BUN/Creatinine   [] Patient age w/no hx of renal dysfunction.   [] Patient on Dialysis.   [] Recent Normal Labs.  Electronically signed by Michelle Valentine DO on 24 at 7:58 PM EDT               Michelle Valentine DO  24

## 2024-05-26 NOTE — ED PROVIDER NOTES
Patient presents to the emergency department from the nursing home.  Daughter provides a history.  She states that they were concerned about rectal bleeding.  Patient has dementia and provides a limited history.  He does states that he has stomach pain.    The history is provided by a relative and the nursing home.       Review of Systems   Unable to perform ROS: Dementia       Physical Exam  Vitals and nursing note reviewed.   Constitutional:       Appearance: He is well-developed and normal weight. He is ill-appearing. He is not toxic-appearing or diaphoretic.   HENT:      Head: Normocephalic and atraumatic.      Nose: Nose normal.      Mouth/Throat:      Mouth: Mucous membranes are dry.      Comments: Fissured tongue  Eyes:      Extraocular Movements: Extraocular movements intact.      Conjunctiva/sclera: Conjunctivae normal.      Pupils: Pupils are equal, round, and reactive to light.   Cardiovascular:      Rate and Rhythm: Regular rhythm. Tachycardia present.      Pulses: Normal pulses.      Heart sounds: Normal heart sounds. No murmur heard.  Pulmonary:      Effort: Pulmonary effort is normal. No respiratory distress.      Breath sounds: Normal breath sounds. No wheezing or rales.   Abdominal:      General: Bowel sounds are normal.      Palpations: Abdomen is soft.      Tenderness: There is no abdominal tenderness (Mild lower abdominal). There is no guarding or rebound.   Genitourinary:     Rectum: Guaiac result positive.      Comments: Melena present on exam  Musculoskeletal:      Cervical back: Normal range of motion and neck supple. No rigidity.      Right lower leg: No edema.      Left lower leg: No edema.   Skin:     General: Skin is warm and dry.      Capillary Refill: Capillary refill takes 2 to 3 seconds.      Coloration: Skin is pale.   Neurological:      General: No focal deficit present.      Mental Status: He is alert. Mental status is at baseline.      Cranial Nerves: No cranial nerve deficit.

## 2024-05-27 PROBLEM — J18.9 COMMUNITY ACQUIRED PNEUMONIA OF LEFT LOWER LOBE OF LUNG: Status: ACTIVE | Noted: 2024-05-27

## 2024-05-27 PROBLEM — N32.0 BLADDER OUTLET OBSTRUCTION: Status: ACTIVE | Noted: 2024-05-27

## 2024-05-27 PROBLEM — K92.2 GI BLEED: Status: ACTIVE | Noted: 2024-05-27

## 2024-05-27 PROBLEM — F02.818: Status: ACTIVE | Noted: 2024-05-27

## 2024-05-27 PROBLEM — I26.93 SINGLE SUBSEGMENTAL PULMONARY EMBOLISM WITHOUT ACUTE COR PULMONALE (HCC): Status: ACTIVE | Noted: 2024-05-27

## 2024-05-27 LAB
ANION GAP SERPL CALCULATED.3IONS-SCNC: 12 MMOL/L (ref 7–16)
BILIRUB UR QL STRIP: NEGATIVE
BUN SERPL-MCNC: 50 MG/DL (ref 6–23)
CALCIUM SERPL-MCNC: 9.6 MG/DL (ref 8.6–10.2)
CASTS #/AREA URNS LPF: ABNORMAL /LPF
CEA SERPL-MCNC: 9.9 NG/ML (ref 0–5.2)
CHLORIDE SERPL-SCNC: 117 MMOL/L (ref 98–107)
CLARITY UR: ABNORMAL
CO2 SERPL-SCNC: 27 MMOL/L (ref 22–29)
COLOR UR: YELLOW
CREAT SERPL-MCNC: 1 MG/DL (ref 0.7–1.2)
FERRITIN SERPL-MCNC: 384 NG/ML
GFR, ESTIMATED: 74 ML/MIN/1.73M2
GLUCOSE SERPL-MCNC: 102 MG/DL (ref 74–99)
GLUCOSE UR STRIP-MCNC: NEGATIVE MG/DL
HCT VFR BLD AUTO: 45.4 % (ref 37–54)
HCT VFR BLD AUTO: 46.8 % (ref 37–54)
HCT VFR BLD AUTO: 46.9 % (ref 37–54)
HCT VFR BLD AUTO: 47.8 % (ref 37–54)
HGB BLD-MCNC: 15 G/DL (ref 12.5–16.5)
HGB BLD-MCNC: 15.1 G/DL (ref 12.5–16.5)
HGB BLD-MCNC: 15.2 G/DL (ref 12.5–16.5)
HGB BLD-MCNC: 16 G/DL (ref 12.5–16.5)
HGB UR QL STRIP.AUTO: ABNORMAL
KETONES UR STRIP-MCNC: NEGATIVE MG/DL
L PNEUMO1 AG UR QL IA.RAPID: NEGATIVE
LACTATE BLDV-SCNC: 1.7 MMOL/L (ref 0.5–2.2)
LACTATE BLDV-SCNC: 2.4 MMOL/L (ref 0.5–2.2)
LEUKOCYTE ESTERASE UR QL STRIP: ABNORMAL
NITRITE UR QL STRIP: NEGATIVE
PH UR STRIP: 5.5 [PH] (ref 5–9)
POTASSIUM SERPL-SCNC: 4.1 MMOL/L (ref 3.5–5)
PROT UR STRIP-MCNC: 30 MG/DL
RBC #/AREA URNS HPF: ABNORMAL /HPF
S PNEUM AG SPEC QL: NEGATIVE
SODIUM SERPL-SCNC: 156 MMOL/L (ref 132–146)
SP GR UR STRIP: 1.01 (ref 1–1.03)
SPECIMEN SOURCE: NORMAL
TROPONIN I SERPL HS-MCNC: 90 NG/L (ref 0–11)
UROBILINOGEN UR STRIP-ACNC: 0.2 EU/DL (ref 0–1)
WBC #/AREA URNS HPF: ABNORMAL /HPF

## 2024-05-27 PROCEDURE — 2500000003 HC RX 250 WO HCPCS: Performed by: FAMILY MEDICINE

## 2024-05-27 PROCEDURE — 87154 CUL TYP ID BLD PTHGN 6+ TRGT: CPT

## 2024-05-27 PROCEDURE — 2500000003 HC RX 250 WO HCPCS: Performed by: EMERGENCY MEDICINE

## 2024-05-27 PROCEDURE — 82378 CARCINOEMBRYONIC ANTIGEN: CPT

## 2024-05-27 PROCEDURE — 6360000002 HC RX W HCPCS: Performed by: EMERGENCY MEDICINE

## 2024-05-27 PROCEDURE — 80048 BASIC METABOLIC PNL TOTAL CA: CPT

## 2024-05-27 PROCEDURE — 82105 ALPHA-FETOPROTEIN SERUM: CPT

## 2024-05-27 PROCEDURE — 84145 PROCALCITONIN (PCT): CPT

## 2024-05-27 PROCEDURE — 2580000003 HC RX 258: Performed by: FAMILY MEDICINE

## 2024-05-27 PROCEDURE — 86301 IMMUNOASSAY TUMOR CA 19-9: CPT

## 2024-05-27 PROCEDURE — 87449 NOS EACH ORGANISM AG IA: CPT

## 2024-05-27 PROCEDURE — 87040 BLOOD CULTURE FOR BACTERIA: CPT

## 2024-05-27 PROCEDURE — 99223 1ST HOSP IP/OBS HIGH 75: CPT | Performed by: FAMILY MEDICINE

## 2024-05-27 PROCEDURE — 87077 CULTURE AEROBIC IDENTIFY: CPT

## 2024-05-27 PROCEDURE — 80074 ACUTE HEPATITIS PANEL: CPT

## 2024-05-27 PROCEDURE — 36415 COLL VENOUS BLD VENIPUNCTURE: CPT

## 2024-05-27 PROCEDURE — 94640 AIRWAY INHALATION TREATMENT: CPT

## 2024-05-27 PROCEDURE — 82728 ASSAY OF FERRITIN: CPT

## 2024-05-27 PROCEDURE — 2060000000 HC ICU INTERMEDIATE R&B

## 2024-05-27 PROCEDURE — 87899 AGENT NOS ASSAY W/OPTIC: CPT

## 2024-05-27 PROCEDURE — C9113 INJ PANTOPRAZOLE SODIUM, VIA: HCPCS | Performed by: FAMILY MEDICINE

## 2024-05-27 PROCEDURE — 86317 IMMUNOASSAY INFECTIOUS AGENT: CPT

## 2024-05-27 PROCEDURE — 6360000002 HC RX W HCPCS: Performed by: FAMILY MEDICINE

## 2024-05-27 PROCEDURE — 87205 SMEAR GRAM STAIN: CPT

## 2024-05-27 PROCEDURE — A4216 STERILE WATER/SALINE, 10 ML: HCPCS | Performed by: FAMILY MEDICINE

## 2024-05-27 PROCEDURE — 81001 URINALYSIS AUTO W/SCOPE: CPT

## 2024-05-27 PROCEDURE — 6370000000 HC RX 637 (ALT 250 FOR IP): Performed by: FAMILY MEDICINE

## 2024-05-27 PROCEDURE — 83605 ASSAY OF LACTIC ACID: CPT

## 2024-05-27 PROCEDURE — 6360000002 HC RX W HCPCS: Performed by: INTERNAL MEDICINE

## 2024-05-27 PROCEDURE — 2580000003 HC RX 258: Performed by: INTERNAL MEDICINE

## 2024-05-27 PROCEDURE — 85014 HEMATOCRIT: CPT

## 2024-05-27 PROCEDURE — 87070 CULTURE OTHR SPECIMN AEROBIC: CPT

## 2024-05-27 PROCEDURE — 85018 HEMOGLOBIN: CPT

## 2024-05-27 PROCEDURE — 2580000003 HC RX 258: Performed by: EMERGENCY MEDICINE

## 2024-05-27 PROCEDURE — 87086 URINE CULTURE/COLONY COUNT: CPT

## 2024-05-27 RX ORDER — SODIUM CHLORIDE 0.9 % (FLUSH) 0.9 %
5-40 SYRINGE (ML) INJECTION PRN
Status: DISCONTINUED | OUTPATIENT
Start: 2024-05-27 | End: 2024-06-01 | Stop reason: HOSPADM

## 2024-05-27 RX ORDER — SODIUM CHLORIDE 9 MG/ML
INJECTION, SOLUTION INTRAVENOUS PRN
Status: DISCONTINUED | OUTPATIENT
Start: 2024-05-27 | End: 2024-06-01 | Stop reason: HOSPADM

## 2024-05-27 RX ORDER — SODIUM CHLORIDE 0.9 % (FLUSH) 0.9 %
5-40 SYRINGE (ML) INJECTION EVERY 12 HOURS SCHEDULED
Status: DISCONTINUED | OUTPATIENT
Start: 2024-05-27 | End: 2024-06-01 | Stop reason: HOSPADM

## 2024-05-27 RX ORDER — ACETAMINOPHEN 325 MG/1
650 TABLET ORAL EVERY 6 HOURS PRN
COMMUNITY

## 2024-05-27 RX ORDER — LORAZEPAM 1 MG/1
1 TABLET ORAL EVERY 12 HOURS PRN
COMMUNITY

## 2024-05-27 RX ORDER — BARRIER CREAM 200; 4.5 MG/G; MG/G
1 CREAM TOPICAL 2 TIMES DAILY PRN
COMMUNITY

## 2024-05-27 RX ORDER — LORAZEPAM 1 MG/1
1 TABLET ORAL EVERY 12 HOURS PRN
Status: DISCONTINUED | OUTPATIENT
Start: 2024-05-27 | End: 2024-06-01 | Stop reason: HOSPADM

## 2024-05-27 RX ORDER — SODIUM CHLORIDE 9 MG/ML
INJECTION, SOLUTION INTRAVENOUS CONTINUOUS
Status: DISCONTINUED | OUTPATIENT
Start: 2024-05-27 | End: 2024-05-28

## 2024-05-27 RX ORDER — POLYETHYLENE GLYCOL 3350 17 G/17G
17 POWDER, FOR SOLUTION ORAL DAILY PRN
Status: DISCONTINUED | OUTPATIENT
Start: 2024-05-27 | End: 2024-06-01 | Stop reason: HOSPADM

## 2024-05-27 RX ORDER — HALOPERIDOL 5 MG/ML
1 INJECTION INTRAMUSCULAR EVERY 6 HOURS PRN
Status: DISCONTINUED | OUTPATIENT
Start: 2024-05-27 | End: 2024-06-01 | Stop reason: HOSPADM

## 2024-05-27 RX ORDER — ACETAMINOPHEN 325 MG/1
650 TABLET ORAL EVERY 6 HOURS PRN
Status: DISCONTINUED | OUTPATIENT
Start: 2024-05-27 | End: 2024-06-01 | Stop reason: HOSPADM

## 2024-05-27 RX ORDER — IPRATROPIUM BROMIDE AND ALBUTEROL SULFATE 2.5; .5 MG/3ML; MG/3ML
1 SOLUTION RESPIRATORY (INHALATION)
Status: DISCONTINUED | OUTPATIENT
Start: 2024-05-27 | End: 2024-06-01 | Stop reason: HOSPADM

## 2024-05-27 RX ORDER — DIVALPROEX SODIUM 125 MG/1
125 CAPSULE, COATED PELLETS ORAL EVERY 8 HOURS SCHEDULED
Status: DISCONTINUED | OUTPATIENT
Start: 2024-05-27 | End: 2024-06-01 | Stop reason: HOSPADM

## 2024-05-27 RX ORDER — HALOPERIDOL 5 MG/ML
1 INJECTION INTRAMUSCULAR EVERY 6 HOURS PRN
COMMUNITY

## 2024-05-27 RX ORDER — ACETAMINOPHEN 650 MG/1
650 SUPPOSITORY RECTAL EVERY 6 HOURS PRN
Status: DISCONTINUED | OUTPATIENT
Start: 2024-05-27 | End: 2024-06-01 | Stop reason: HOSPADM

## 2024-05-27 RX ADMIN — DIVALPROEX SODIUM 125 MG: 125 CAPSULE ORAL at 20:51

## 2024-05-27 RX ADMIN — SODIUM CHLORIDE, PRESERVATIVE FREE 40 MG: 5 INJECTION INTRAVENOUS at 18:27

## 2024-05-27 RX ADMIN — WATER 1000 MG: 1 INJECTION INTRAMUSCULAR; INTRAVENOUS; SUBCUTANEOUS at 02:53

## 2024-05-27 RX ADMIN — IPRATROPIUM BROMIDE AND ALBUTEROL SULFATE 1 DOSE: .5; 2.5 SOLUTION RESPIRATORY (INHALATION) at 18:33

## 2024-05-27 RX ADMIN — DOXYCYCLINE 100 MG: 100 INJECTION, POWDER, LYOPHILIZED, FOR SOLUTION INTRAVENOUS at 18:26

## 2024-05-27 RX ADMIN — AZITHROMYCIN MONOHYDRATE 500 MG: 500 INJECTION, POWDER, LYOPHILIZED, FOR SOLUTION INTRAVENOUS at 21:00

## 2024-05-27 RX ADMIN — SODIUM CHLORIDE: 9 INJECTION, SOLUTION INTRAVENOUS at 17:28

## 2024-05-27 RX ADMIN — PIPERACILLIN AND TAZOBACTAM 4500 MG: 4; .5 INJECTION, POWDER, LYOPHILIZED, FOR SOLUTION INTRAVENOUS at 20:59

## 2024-05-27 RX ADMIN — SODIUM CHLORIDE: 9 INJECTION, SOLUTION INTRAVENOUS at 08:01

## 2024-05-27 RX ADMIN — ACETAMINOPHEN 650 MG: 325 TABLET ORAL at 20:50

## 2024-05-27 RX ADMIN — IPRATROPIUM BROMIDE AND ALBUTEROL SULFATE 1 DOSE: .5; 2.5 SOLUTION RESPIRATORY (INHALATION) at 08:59

## 2024-05-27 RX ADMIN — Medication 10 ML: at 21:00

## 2024-05-27 RX ADMIN — IPRATROPIUM BROMIDE AND ALBUTEROL SULFATE 1 DOSE: .5; 2.5 SOLUTION RESPIRATORY (INHALATION) at 05:36

## 2024-05-27 RX ADMIN — LORAZEPAM 1 MG: 1 TABLET ORAL at 20:51

## 2024-05-27 RX ADMIN — IPRATROPIUM BROMIDE AND ALBUTEROL SULFATE 1 DOSE: .5; 2.5 SOLUTION RESPIRATORY (INHALATION) at 14:38

## 2024-05-27 RX ADMIN — DOXYCYCLINE 100 MG: 100 INJECTION, POWDER, LYOPHILIZED, FOR SOLUTION INTRAVENOUS at 03:06

## 2024-05-27 RX ADMIN — SODIUM CHLORIDE, PRESERVATIVE FREE 40 MG: 5 INJECTION INTRAVENOUS at 08:02

## 2024-05-27 ASSESSMENT — PAIN DESCRIPTION - ORIENTATION: ORIENTATION: RIGHT

## 2024-05-27 ASSESSMENT — PAIN SCALES - GENERAL
PAINLEVEL_OUTOF10: 4
PAINLEVEL_OUTOF10: 0

## 2024-05-27 ASSESSMENT — PAIN DESCRIPTION - LOCATION: LOCATION: ARM

## 2024-05-27 ASSESSMENT — PAIN DESCRIPTION - DESCRIPTORS: DESCRIPTORS: ACHING

## 2024-05-27 NOTE — PROGRESS NOTES
Pt was admitted earlier today by dr perez my colleague , and was seen and examined by myself  again today , he is confused but pleasant , and in no distress , had UTI and also decubitus ulcer , and pneumonia , and small PE not on anticoagulation for possible GI bleed ,   Hypernatremia due to dehydration continue IV fluids and recheck bmp now , was started on zosyn and zithromax ,

## 2024-05-27 NOTE — PROGRESS NOTES
Pharmacist Review and Automatic Dose Adjustment of Extended Antibiotic Infusions    The reviewing pharmacist has made an adjustment to the ordered ZOSYN dose per the approved [facility] protocol and table as identified below.      Yamila Sharpe MD,    Nikita DALLIN Geremias is a 84 y.o. male.     Renal Function Assessment:    Date Body Weight IBW  Adjusted BW SCr  CrCl Dialysis status   5/27/2024 74.8 kg (165 lb)  Ideal body weight: 77.6 kg (171 lb 1.2 oz) Serum creatinine: 1.4 mg/dL (H) 05/26/24 1836  Estimated creatinine clearance: 40 mL/min (A) N/a     Estimated Creatinine Clearance: 40 mL/min (A) (based on SCr of 1.4 mg/dL (H)).    Recent Labs     05/26/24 1836   CREATININE 1.4*       Height:   Ht Readings from Last 1 Encounters:   05/11/24 1.829 m (6')     Weight:  Wt Readings from Last 1 Encounters:   05/26/24 74.8 kg (165 lb)               Plan: Based upon the patient's weight, antibiotic indication, and renal function, the ordered ZOSYN dose of 3375 mg every 8 hours has been changed/converted to 4500 mg every 8 hours.      Thank you,  Margarette Restrepo [facility]  5/27/2024   7:27 PM

## 2024-05-27 NOTE — PROGRESS NOTES
4 Eyes Skin Assessment     NAME:  Nikita Archuleta  YOB: 1940  MEDICAL RECORD NUMBER:  [id number]    The patient is being assessed for  Admission    I agree that at least one RN has performed a thorough Head to Toe Skin Assessment on the patient. ALL assessment sites listed below have been assessed.      Areas assessed by both nurses:    Head, Face, Ears, Shoulders, Back, Chest, Arms, Elbows, Hands, Sacrum. Buttock, Coccyx, Ischium, Legs. Feet and Heels, and Under Medical Devices         Does the Patient have a Wound? Yes wound(s) were present on assessment. LDA wound assessment was Initiated and completed by RN    Sacrum Mid Open Pressure Injury, Mid-Coccyx, Right Medial Buttocks Non-Blanchable Pressure Injury, Moisture Incontinence Associated Dermatitis on Coccyx and Groin, Red but Blanchable Boggy Heels.       Parth Prevention initiated by RN: Yes  Wound Care Orders initiated by RN: Yes    Pressure Injury (Stage 3,4, Unstageable, DTI, NWPT, and Complex wounds) if present, place Wound referral order by RN under : Yes    New Ostomies, if present place, Ostomy referral order under : No     Nurse 1 eSignature: Electronically signed by Paul Alarcon RN on 5/27/24 at 6:46 PM EDT    **SHARE this note so that the co-signing nurse can place an eSignature**    Nurse 2 eSignature: Electronically signed by Lakshmi Cline RN on 5/27/24 at 7:21 PM EDT

## 2024-05-27 NOTE — CONSULTS
low as reasonably achievable. COMPARISON: None. HISTORY: ORDERING SYSTEM PROVIDED HISTORY: fall, neck pain TECHNOLOGIST PROVIDED HISTORY: Reason for exam:->fall, neck pain Decision Support Exception - unselect if not a suspected or confirmed emergency medical condition->Emergency Medical Condition (MA) FINDINGS: The ring of C1 is intact as is the dense.  There is no compression fracture of the cervical spine.  No jumped or perched facet is noted. Multilevel degenerative disc and degenerative joint disease is noted. The prevertebral soft tissues are unremarkable.  The airway is widely patent. Images through the lung apices are negative for a pneumothorax.     1. There is no acute compression fracture or subluxation of the cervical spine. 2. Multilevel degenerative disc and degenerative joint disease.     XR PELVIS (1-2 VIEWS)    Result Date: 5/17/2024  EXAMINATION: ONE XRAY VIEW OF THE PELVIS 5/17/2024 12:47 pm COMPARISON: None. HISTORY: ORDERING SYSTEM PROVIDED HISTORY: fall TECHNOLOGIST PROVIDED HISTORY: Reason for exam:->fall FINDINGS: Lower lumbar degenerative disease.  Bones mildly demineralized.  Vascular stent superimposing the lower lumbar spine on the left.  Mild bilateral hip DJD.  No dislocation or displaced fracture on single view exam. Atherosclerotic vascular calcifications.     No dislocation or displaced fracture on single view exam. RECOMMENDATION: Short-term follow-up radiographs in 7-10 days or cross-sectional imaging (CT/MRI) if there is persistent concern for fracture or the symptoms persist.     CT Head W/O Contrast    Result Date: 5/17/2024  EXAMINATION: CT OF THE HEAD WITHOUT CONTRAST  5/17/2024 12:39 pm TECHNIQUE: CT of the head was performed without the administration of intravenous contrast. Automated exposure control, iterative reconstruction, and/or weight based adjustment of the mA/kV was utilized to reduce the radiation dose to as low as reasonably achievable. COMPARISON: 05/11/2024.  HISTORY: fall, right pain TECHNOLOGIST PROVIDED HISTORY: Reason for exam:->fall, right pain FINDINGS: No airspace opacity or pleural effusion. The heart is normal size. No pneumothorax. No free air beneath the hemidiaphragms.     No pneumonia or pleural effusion.     CT ABDOMEN PELVIS WO CONTRAST Additional Contrast? None    Result Date: 5/14/2024  EXAMINATION: CT OF THE ABDOMEN AND PELVIS WITHOUT CONTRAST 5/14/2024 5:12 pm TECHNIQUE: CT of the abdomen and pelvis was performed without the administration of intravenous contrast. Multiplanar reformatted images are provided for review. Automated exposure control, iterative reconstruction, and/or weight based adjustment of the mA/kV was utilized to reduce the radiation dose to as low as reasonably achievable. COMPARISON: None. HISTORY: ORDERING SYSTEM PROVIDED HISTORY: bladder mass TECHNOLOGIST PROVIDED HISTORY: Reason for exam:->bladder mass Additional Contrast?->None FINDINGS: Lower Chest: Visualized lungs are normal. Organs: Cholecystectomy.  The liver, spleen, adrenal glands, pancreas are normal.  3.5 cm simple right renal cyst. GI/Bowel:   Colonic fecal retention.  Normal small bowel and appendix. Pelvis:   Large bladder wall mass highly worrisome for malignancy.  This mass is difficult to accurately measure but measures at least up to 6.0 cm. Peritoneum/Retroperitoneum:   The no free fluid or free air.  Normal caliber abdominal aorta and iliac arteries with extensive calcified atherosclerotic plaque. Bones/Soft Tissues:   Degenerative changes lumbar spine and hip joints.     1. Large bladder wall mass highly worrisome for malignancy. This mass is difficult to accurately measure but measures at least up to 6.0 cm. Urological consultation recommended. 2. 3.5 cm simple right renal cyst. 3. Thank that     US RETROPERITONEAL COMPLETE    Result Date: 5/13/2024  EXAMINATION: RETROPERITONEAL ULTRASOUND OF THE KIDNEYS AND URINARY BLADDER 5/13/2024 COMPARISON: None HISTORY:

## 2024-05-27 NOTE — H&P
lower lobe (axial images A3/80; coronal images A2/56-54). No other acute pulmonary embolus can be identified the in the right lung pulmonary artery circulation or in the left lung pulmonary artery circulation, or in the central pulmonary arteries in the mediastinum. There are calcified atherosclerotic changes and ectasia tortuous of the thoracic aorta.  There is no dissection.  Calcified plaques are seen from the arch of the aorta through the descending thoracic aorta into the upper abdominal aorta. Cardiac area is a normal size.  LV inner diameter: 3.1 cm.  RV inner diameter: 3.6 cm.  Calcifications are seen in the coronary arteries.  There is no pericardial effusion. There is no mediastinal mass or adenopathy. There is development of areas of a bronchial pneumonia or aspiration or combination of both possibilities in the posterior basal region of the left lower lobe as new development since the study of May 14 which superimposed areas chronic subpleural fibrosis and atelectasis. There diffuse emphysematous changes in lung parenchyma as observed previously. Upper abdominal structures not fully covered on this study.  What is visualized have unremarkable appearance.  There is contrast in the collecting system for the kidneys.  Patient had previous CT abdomen pelvis performed same day earlier.  See report of CT abdomen pelvis recently performed. Degenerative changes in moderate degree seen throughout the thoracic spine with osteopenia.     Presence of a focal localized acute pulmonary embolus at the bifurcation of a posterior segmental artery for the posterior segment of the right lower lobe. Development of areas of bronchial pneumonia or aspiration or combination of both possibilities in the posterior basal region of the left lower lobe since the study of May 14. Preliminary report given to Dr. Simms,   physician [facility], at the end of the interpretation.     CT ABDOMEN PELVIS W IV CONTRAST Additional  bladder.  The largest lesion to the right of the midline anteriorly measures 31 x 25 mm and posteriorly to the left the largest lesion measures 22 x 26 mm.  Direct visualization may be helpful in further characterization. GI/Bowel: No acute abnormalities of the stomach were observed.  The small bowel pattern is nonobstructive.  The appendix cecum and terminal ileum appear within the normal range.  There is a moderate volume of retained fecal debris within the colon.  There is no CT evidence of active GI bleed Pelvis:   The prostate gland measures 4.6 cm that is felt to be at the upper limits of normal.  As noted above the urinary bladder is markedly distended suggestive of a bladder outlet obstructive process. Peritoneum/Retroperitoneum: There are no signs of retroperitoneal lymphadenopathy or aneurysm. Bones/Soft Tissues: No discrete osteolytic or blastic lesions are identified.     1. Marked distention of the urinary bladder concerning for a bladder outlet obstructive process. 2. There are multiple enhancing masses along the bladder wall concerning for urothelial neoplasm. 3. There is bilateral hydronephrosis and hydroureter. 4. There is concern for pulmonary emboli extending into the right lower lung. 5. There is a moderate volume of retained fecal debris within the colon. 6. There is reflux of contrast into the inferior vena cava concerning for underlying pulmonary hypertension.       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 *      PLAN:    GI bleed  - GI c/s  - avoid anticoagulation  - trend hgb/hct  - PPI    Acute on chronic metabolic

## 2024-05-27 NOTE — PLAN OF CARE
Problem: Safety - Adult  Goal: Free from fall injury  Outcome: Progressing     Problem: Discharge Planning  Goal: Discharge to home or other facility with appropriate resources  Outcome: Progressing  Flowsheets (Taken 5/27/2024 1704)  Discharge to home or other facility with appropriate resources:   Identify barriers to discharge with patient and caregiver   Arrange for needed discharge resources and transportation as appropriate     Problem: Pain  Goal: Verbalizes/displays adequate comfort level or baseline comfort level  Outcome: Progressing  Flowsheets (Taken 5/27/2024 1704)  Verbalizes/displays adequate comfort level or baseline comfort level: Encourage patient to monitor pain and request assistance

## 2024-05-28 ENCOUNTER — APPOINTMENT (OUTPATIENT)
Dept: ULTRASOUND IMAGING | Age: 84
End: 2024-05-28
Payer: MEDICARE

## 2024-05-28 PROBLEM — N32.89 MASS OF BLADDER: Status: ACTIVE | Noted: 2024-05-28

## 2024-05-28 PROBLEM — E87.0 HYPERNATREMIA: Status: ACTIVE | Noted: 2024-05-28

## 2024-05-28 PROBLEM — N30.01 ACUTE CYSTITIS WITH HEMATURIA: Status: ACTIVE | Noted: 2024-05-28

## 2024-05-28 PROBLEM — K86.89 PANCREATIC DUCT DILATED: Status: ACTIVE | Noted: 2024-05-28

## 2024-05-28 LAB
ANION GAP SERPL CALCULATED.3IONS-SCNC: 11 MMOL/L (ref 7–16)
ANION GAP SERPL CALCULATED.3IONS-SCNC: 13 MMOL/L (ref 7–16)
ANION GAP SERPL CALCULATED.3IONS-SCNC: 9 MMOL/L (ref 7–16)
BUN SERPL-MCNC: 28 MG/DL (ref 6–23)
BUN SERPL-MCNC: 32 MG/DL (ref 6–23)
BUN SERPL-MCNC: 38 MG/DL (ref 6–23)
CALCIUM SERPL-MCNC: 8.4 MG/DL (ref 8.6–10.2)
CALCIUM SERPL-MCNC: 8.5 MG/DL (ref 8.6–10.2)
CALCIUM SERPL-MCNC: 8.6 MG/DL (ref 8.6–10.2)
CHLORIDE SERPL-SCNC: 120 MMOL/L (ref 98–107)
CHLORIDE SERPL-SCNC: 122 MMOL/L (ref 98–107)
CHLORIDE SERPL-SCNC: 124 MMOL/L (ref 98–107)
CO2 SERPL-SCNC: 23 MMOL/L (ref 22–29)
CO2 SERPL-SCNC: 25 MMOL/L (ref 22–29)
CO2 SERPL-SCNC: 26 MMOL/L (ref 22–29)
CREAT SERPL-MCNC: 0.9 MG/DL (ref 0.7–1.2)
CREAT SERPL-MCNC: 1 MG/DL (ref 0.7–1.2)
CREAT SERPL-MCNC: 1 MG/DL (ref 0.7–1.2)
GFR, ESTIMATED: 72 ML/MIN/1.73M2
GFR, ESTIMATED: 76 ML/MIN/1.73M2
GFR, ESTIMATED: 80 ML/MIN/1.73M2
GLUCOSE SERPL-MCNC: 129 MG/DL (ref 74–99)
GLUCOSE SERPL-MCNC: 130 MG/DL (ref 74–99)
GLUCOSE SERPL-MCNC: 94 MG/DL (ref 74–99)
HAV IGM SERPL QL IA: NONREACTIVE
HBV CORE IGM SERPL QL IA: NONREACTIVE
HBV SURFACE AB SERPL IA-ACNC: <3.1 MIU/ML (ref 0–9.99)
HBV SURFACE AG SERPL QL IA: NONREACTIVE
HCT VFR BLD AUTO: 40.7 % (ref 37–54)
HCT VFR BLD AUTO: 43.4 % (ref 37–54)
HCT VFR BLD AUTO: 44.2 % (ref 37–54)
HCV AB SERPL QL IA: NONREACTIVE
HGB BLD-MCNC: 13 G/DL (ref 12.5–16.5)
HGB BLD-MCNC: 14 G/DL (ref 12.5–16.5)
HGB BLD-MCNC: 14.1 G/DL (ref 12.5–16.5)
INR PPP: 1.2
IRON SATN MFR SERPL: 29 % (ref 20–55)
IRON SERPL-MCNC: 57 UG/DL (ref 59–158)
PARTIAL THROMBOPLASTIN TIME: 27.3 SEC (ref 24.5–35.1)
POTASSIUM SERPL-SCNC: 3.4 MMOL/L (ref 3.5–5)
POTASSIUM SERPL-SCNC: 3.5 MMOL/L (ref 3.5–5)
POTASSIUM SERPL-SCNC: 3.7 MMOL/L (ref 3.5–5)
PROCALCITONIN SERPL-MCNC: 0.1 NG/ML (ref 0–0.08)
PROTHROMBIN TIME: 13.8 SEC (ref 9.3–12.4)
SODIUM SERPL-SCNC: 156 MMOL/L (ref 132–146)
SODIUM SERPL-SCNC: 156 MMOL/L (ref 132–146)
SODIUM SERPL-SCNC: 161 MMOL/L (ref 132–146)
TIBC SERPL-MCNC: 200 UG/DL (ref 250–450)

## 2024-05-28 PROCEDURE — A4216 STERILE WATER/SALINE, 10 ML: HCPCS | Performed by: FAMILY MEDICINE

## 2024-05-28 PROCEDURE — 6360000002 HC RX W HCPCS: Performed by: SPECIALIST

## 2024-05-28 PROCEDURE — 87899 AGENT NOS ASSAY W/OPTIC: CPT

## 2024-05-28 PROCEDURE — 6360000002 HC RX W HCPCS: Performed by: INTERNAL MEDICINE

## 2024-05-28 PROCEDURE — 94640 AIRWAY INHALATION TREATMENT: CPT

## 2024-05-28 PROCEDURE — 97530 THERAPEUTIC ACTIVITIES: CPT

## 2024-05-28 PROCEDURE — C9113 INJ PANTOPRAZOLE SODIUM, VIA: HCPCS | Performed by: FAMILY MEDICINE

## 2024-05-28 PROCEDURE — 87449 NOS EACH ORGANISM AG IA: CPT

## 2024-05-28 PROCEDURE — 6360000002 HC RX W HCPCS: Performed by: FAMILY MEDICINE

## 2024-05-28 PROCEDURE — 92610 EVALUATE SWALLOWING FUNCTION: CPT | Performed by: SPEECH-LANGUAGE PATHOLOGIST

## 2024-05-28 PROCEDURE — 6370000000 HC RX 637 (ALT 250 FOR IP): Performed by: INTERNAL MEDICINE

## 2024-05-28 PROCEDURE — 2580000003 HC RX 258: Performed by: INTERNAL MEDICINE

## 2024-05-28 PROCEDURE — 83540 ASSAY OF IRON: CPT

## 2024-05-28 PROCEDURE — 2580000003 HC RX 258: Performed by: FAMILY MEDICINE

## 2024-05-28 PROCEDURE — 6370000000 HC RX 637 (ALT 250 FOR IP): Performed by: FAMILY MEDICINE

## 2024-05-28 PROCEDURE — 97165 OT EVAL LOW COMPLEX 30 MIN: CPT

## 2024-05-28 PROCEDURE — 85610 PROTHROMBIN TIME: CPT

## 2024-05-28 PROCEDURE — 0202U NFCT DS 22 TRGT SARS-COV-2: CPT

## 2024-05-28 PROCEDURE — 36415 COLL VENOUS BLD VENIPUNCTURE: CPT

## 2024-05-28 PROCEDURE — 2500000003 HC RX 250 WO HCPCS: Performed by: FAMILY MEDICINE

## 2024-05-28 PROCEDURE — 2060000000 HC ICU INTERMEDIATE R&B

## 2024-05-28 PROCEDURE — 93970 EXTREMITY STUDY: CPT

## 2024-05-28 PROCEDURE — 99233 SBSQ HOSP IP/OBS HIGH 50: CPT | Performed by: INTERNAL MEDICINE

## 2024-05-28 PROCEDURE — 2580000003 HC RX 258: Performed by: SPECIALIST

## 2024-05-28 PROCEDURE — 85730 THROMBOPLASTIN TIME PARTIAL: CPT

## 2024-05-28 PROCEDURE — 83550 IRON BINDING TEST: CPT

## 2024-05-28 PROCEDURE — 80048 BASIC METABOLIC PNL TOTAL CA: CPT

## 2024-05-28 PROCEDURE — 86738 MYCOPLASMA ANTIBODY: CPT

## 2024-05-28 RX ORDER — DEXTROSE AND SODIUM CHLORIDE 5; .2 G/100ML; G/100ML
INJECTION, SOLUTION INTRAVENOUS CONTINUOUS
Status: DISCONTINUED | OUTPATIENT
Start: 2024-05-28 | End: 2024-05-31

## 2024-05-28 RX ORDER — ENOXAPARIN SODIUM 100 MG/ML
1 INJECTION SUBCUTANEOUS 2 TIMES DAILY
Status: DISCONTINUED | OUTPATIENT
Start: 2024-05-28 | End: 2024-06-01 | Stop reason: HOSPADM

## 2024-05-28 RX ADMIN — ANORECTAL OINTMENT 1 EACH: 15.7; .44; 24; 20.6 OINTMENT TOPICAL at 20:01

## 2024-05-28 RX ADMIN — DIVALPROEX SODIUM 125 MG: 125 CAPSULE ORAL at 15:22

## 2024-05-28 RX ADMIN — DIVALPROEX SODIUM 125 MG: 125 CAPSULE ORAL at 06:26

## 2024-05-28 RX ADMIN — DIVALPROEX SODIUM 125 MG: 125 CAPSULE ORAL at 20:01

## 2024-05-28 RX ADMIN — SODIUM CHLORIDE, PRESERVATIVE FREE 40 MG: 5 INJECTION INTRAVENOUS at 17:23

## 2024-05-28 RX ADMIN — ANORECTAL OINTMENT 1 EACH: 15.7; .44; 24; 20.6 OINTMENT TOPICAL at 17:24

## 2024-05-28 RX ADMIN — IPRATROPIUM BROMIDE AND ALBUTEROL SULFATE 1 DOSE: .5; 2.5 SOLUTION RESPIRATORY (INHALATION) at 09:07

## 2024-05-28 RX ADMIN — PIPERACILLIN AND TAZOBACTAM 4500 MG: 4; .5 INJECTION, POWDER, LYOPHILIZED, FOR SOLUTION INTRAVENOUS at 03:22

## 2024-05-28 RX ADMIN — DEXTROSE AND SODIUM CHLORIDE: 5; 200 INJECTION, SOLUTION INTRAVENOUS at 22:01

## 2024-05-28 RX ADMIN — MEROPENEM 1000 MG: 1 INJECTION, POWDER, FOR SOLUTION INTRAVENOUS at 17:23

## 2024-05-28 RX ADMIN — DEXTROSE AND SODIUM CHLORIDE: 5; 200 INJECTION, SOLUTION INTRAVENOUS at 09:01

## 2024-05-28 RX ADMIN — PIPERACILLIN AND TAZOBACTAM 4500 MG: 4; .5 INJECTION, POWDER, LYOPHILIZED, FOR SOLUTION INTRAVENOUS at 13:35

## 2024-05-28 RX ADMIN — IPRATROPIUM BROMIDE AND ALBUTEROL SULFATE 1 DOSE: .5; 2.5 SOLUTION RESPIRATORY (INHALATION) at 18:04

## 2024-05-28 RX ADMIN — SODIUM CHLORIDE, PRESERVATIVE FREE 40 MG: 5 INJECTION INTRAVENOUS at 06:26

## 2024-05-28 RX ADMIN — Medication 10 ML: at 09:02

## 2024-05-28 RX ADMIN — ACETAMINOPHEN 650 MG: 325 TABLET ORAL at 17:47

## 2024-05-28 RX ADMIN — IPRATROPIUM BROMIDE AND ALBUTEROL SULFATE 1 DOSE: .5; 2.5 SOLUTION RESPIRATORY (INHALATION) at 05:33

## 2024-05-28 RX ADMIN — ACETAMINOPHEN 650 MG: 325 TABLET ORAL at 03:21

## 2024-05-28 RX ADMIN — LORAZEPAM 1 MG: 1 TABLET ORAL at 19:00

## 2024-05-28 RX ADMIN — ENOXAPARIN SODIUM 70 MG: 100 INJECTION SUBCUTANEOUS at 20:01

## 2024-05-28 RX ADMIN — AZITHROMYCIN MONOHYDRATE 500 MG: 500 INJECTION, POWDER, LYOPHILIZED, FOR SOLUTION INTRAVENOUS at 18:23

## 2024-05-28 RX ADMIN — DOXYCYCLINE 100 MG: 100 INJECTION, POWDER, LYOPHILIZED, FOR SOLUTION INTRAVENOUS at 06:32

## 2024-05-28 ASSESSMENT — PAIN SCALES - GENERAL
PAINLEVEL_OUTOF10: 7
PAINLEVEL_OUTOF10: 5

## 2024-05-28 ASSESSMENT — ENCOUNTER SYMPTOMS
BLOOD IN STOOL: 0
CHEST TIGHTNESS: 0
CONSTIPATION: 0
DIARRHEA: 0
FACIAL SWELLING: 0
ABDOMINAL DISTENTION: 0
RHINORRHEA: 0
COUGH: 0
SHORTNESS OF BREATH: 0
NAUSEA: 0
WHEEZING: 0
ABDOMINAL PAIN: 0
SINUS PAIN: 0

## 2024-05-28 ASSESSMENT — PAIN DESCRIPTION - DESCRIPTORS: DESCRIPTORS: THROBBING

## 2024-05-28 ASSESSMENT — PAIN DESCRIPTION - ORIENTATION
ORIENTATION: RIGHT
ORIENTATION: LEFT;RIGHT

## 2024-05-28 ASSESSMENT — PAIN DESCRIPTION - LOCATION
LOCATION: ARM
LOCATION: ELBOW

## 2024-05-28 NOTE — PLAN OF CARE
Problem: Safety - Adult  Goal: Free from fall injury  Outcome: Progressing  Flowsheets (Taken 5/28/2024 0755)  Free From Fall Injury: Instruct family/caregiver on patient safety     Problem: Discharge Planning  Goal: Discharge to home or other facility with appropriate resources  Outcome: Progressing  Flowsheets (Taken 5/28/2024 0755)  Discharge to home or other facility with appropriate resources:   Identify barriers to discharge with patient and caregiver   Arrange for needed discharge resources and transportation as appropriate     Problem: Pain  Goal: Verbalizes/displays adequate comfort level or baseline comfort level  Outcome: Progressing     Problem: ABCDS Injury Assessment  Goal: Absence of physical injury  Outcome: Progressing  Flowsheets (Taken 5/28/2024 0755)  Absence of Physical Injury: Implement safety measures based on patient assessment     Problem: Skin/Tissue Integrity  Goal: Absence of new skin breakdown  Description: 1.  Monitor for areas of redness and/or skin breakdown  2.  Assess vascular access sites hourly  3.  Every 4-6 hours minimum:  Change oxygen saturation probe site  4.  Every 4-6 hours:  If on nasal continuous positive airway pressure, respiratory therapy assess nares and determine need for appliance change or resting period.  Outcome: Progressing

## 2024-05-28 NOTE — CONSULTS
[address] [place] [address] [address] [place] [address]  Phone [phone]   [phone]      Admit Date: 2024  4:27 PM  Pt Name: Nikita Archuleta  MRN: [id number]  : 1940  Reason for Consult:    Chief Complaint   Patient presents with    Rectal Bleeding     EMS brought in from nursing home, reports patient has had three dark stools today. Patient states he has abdominal pain.      Requesting Physician:  Yamila Camp MD  PCP: Bella Amador MD  History Obtained From:  patient, chart   ID consulted for GI bleed [K92.2]  Bladder mass [N32.89]  Bladder outlet obstruction [N32.0]  Gastrointestinal hemorrhage, unspecified gastrointestinal hemorrhage type [K92.2]  Pneumonia of left lower lobe due to infectious organism [J18.9]  Single subsegmental pulmonary embolism without acute cor pulmonale (HCC) [I26.93]  Sepsis with acute renal failure without septic shock, due to unspecified organism, unspecified acute renal failure type (HCC) [A41.9, R65.20, N17.9]  on hospital day 1  CHIEF COMPLAINT       Chief Complaint   Patient presents with    Rectal Bleeding     EMS brought in from nursing home, reports patient has had three dark stools today. Patient states he has abdominal pain.      HISTORYOF PRESENT ILLNESS   Nikita Archuleta is a 84 y.o. male who  has a past medical history of Hyperlipidemia.   Presented to ED TRIAGE VITALS  BP: 138/60, Temp: 97.8 °F (36.6 °C), Pulse: 90, Respirations: 17, SpO2: 94 %  HPI:  ID was consulted on 24 for infection management  Pt presented to ER on 2024 with diagnosis of  GI bleed [K92.2]  Bladder mass [N32.89]  Bladder outlet obstruction [N32.0]  Gastrointestinal hemorrhage, unspecified gastrointestinal hemorrhage type [K92.2]  Pneumonia of left lower lobe due to infectious organism [J18.9]  Single subsegmental pulmonary embolism without acute cor pulmonale (HCC) [I26.93]  Sepsis with acute renal failure without septic shock, due to unspecified organism,  05/28/24 1335, 4,500 mg at 05/28/24 1335    divalproex (DEPAKOTE SPRINKLE) DR capsule 125 mg, 125 mg, Oral, 3 times per day, Mary Hunt, , 125 mg at 05/28/24 0626    haloperidol lactate (HALDOL) injection 1 mg, 1 mg, IntraMUSCular, Q6H PRN, Mary Hunt H,     LORazepam (ATIVAN) tablet 1 mg, 1 mg, Oral, Q12H PRN, Mary Hunt, , 1 mg at 05/27/24 2051    polyethylene glycol (GLYCOLAX) packet 17 g, 17 g, Oral, Daily PRN, Mary Hunt,   ALLERGIES     Patient has no known allergies.  Immunization History   Administered Date(s) Administered    COVID-19, MODERNA BLUE border, Primary or Immunocompromised, (age 12y+), IM, 100 mcg/0.5mL 01/26/2021, 02/23/2021    COVID-19, MODERNA Booster BLUE border, (age 18y+), IM, 50mcg/0.25mL 11/05/2021      Internal Administration   First Dose COVID-19, MODERNA BLUE border, Primary or Immunocompromised, (age 12y+), IM, 100 mcg/0.5mL  01/26/2021   Second Dose COVID-19, MODERNA BLUE border, Primary or Immunocompromised, (age 12y+), IM, 100 mcg/0.5mL   02/23/2021       Last COVID Lab POC Glucose (mg/dL)   Date Value   05/14/2024 107 (H)          PAST MEDICAL HISTORY     Past Medical History:   Diagnosis Date    Hyperlipidemia      SURGICAL HISTORY       Past Surgical History:   Procedure Laterality Date    CHOLECYSTECTOMY      COLONOSCOPY  01/01/1990    neg    EYE SURGERY      HERNIA REPAIR      INTRACAPSULAR CATARACT EXTRACTION Left 06/29/2021    LEFT CATARACT EXTRACTION WITH IOL performed by Atilio DAVILA MD at [facility] OR     FAMILY HISTORY     No family history on file.  SOCIAL HISTORY       Social History     Socioeconomic History    Marital status:    Occupational History    Occupation: retired   Tobacco Use    Smoking status: Every Day     Current packs/day: 1.00     Types: Cigarettes    Smokeless tobacco: Never   Substance and Sexual Activity    Alcohol use: No    Drug use: No    Sexual activity: Never     Social Determinants of Health

## 2024-05-28 NOTE — PROGRESS NOTES
OCCUPATIONAL THERAPY INITIAL EVALUATION    [facility]  [address] [place]        Date:2024                                                  Patient Name: Nikita Archuleta    MRN: [id number]    : 1940    Room: [address] [place] [address]      Evaluating OT: Humble Batres OTR/L; #[id number]     Referring Provider and Specific Provider Orders/Date:      24  OT eval and treat  Start:  24,   End:  24,   ONE TIME,   Standing Count:  1 Occurrences,   R         Yamila Camp MD      Placement Recommendation: Subacute Rehab       Diagnosis:   1. Gastrointestinal hemorrhage, unspecified gastrointestinal hemorrhage type    2. Single subsegmental pulmonary embolism without acute cor pulmonale (HCC)    3. Sepsis with acute renal failure without septic shock, due to unspecified organism, unspecified acute renal failure type (HCC)    4. Pneumonia of left lower lobe due to infectious organism    5. Bladder mass    6. Bladder outlet obstruction    7. Gastrointestinal hemorrhage with melena         Surgery: None      Pertinent Medical History:       Past Medical History:   Diagnosis Date    Hyperlipidemia          Past Surgical History:   Procedure Laterality Date    CHOLECYSTECTOMY      COLONOSCOPY  1990    neg    EYE SURGERY      HERNIA REPAIR      INTRACAPSULAR CATARACT EXTRACTION Left 2021    LEFT CATARACT EXTRACTION WITH IOL performed by Atilio DAVILA MD at [facility]        Precautions:  Fall Risk, Use lift equipment for lifting patient, Confusion     Assessment of current deficits:     [x] Functional mobility  [x]ADLs  [x] Strength               [x]Cognition    [x] Functional transfers   [x] IADLs         [] Safety Awareness   [x]Endurance    [] Fine Coordination              [x] Balance      [] Vision/perception   []Sensation     []Gross Motor Coordination  [] ROM  [] Delirium                   [] Motor Control  personal grooming?: A Lot  How much help for eating meals?: A Little  AM-PAC Inpatient Daily Activity Raw Score: 12  AM-PAC Inpatient ADL T-Scale Score : 30.6  ADL Inpatient CMS 0-100% Score: 66.57  ADL Inpatient CMS G-Code Modifier : CL     Functional Assessment:    Initial Eval Status  Date: 5/28/24 Treatment Status  Date: STGs = LTGs  Time frame: 10-14 days   Feeding Moderate Assist     Increase confusion/Assistance  Supervision    Grooming Moderate Assist     Increase confusion/Assistance  Supervision    UB Dressing Moderate Assist     Gown management.   Supervision    LB Dressing Maximal Assist   Minimal Assist    Bathing Maximal Assist  Minimal Assist    Toileting Dependent   Moderate Assist    Bed Mobility  Supine to sit: Moderate Assist   Sit to supine: Moderate Assist   Rolling:Minimal Assist    Supine to sit: Modified [place]   Sit to supine: Modified [place]   Rolling:Modified [place]     Functional Transfers Not assessed  d/t pt overall debility, decreased activity tolerance, balance deficits, safety and fall risk.     Moderate Assist    Functional Mobility Not assessed  d/t pt overall debility, decreased activity tolerance, balance deficits, safety and fall risk.    Moderate Assist    Balance Sitting:     Static: fair     Dynamic: fair -  Standing: Not assessed  d/t pt overall debility, decreased activity tolerance, balance deficits, safety and fall risk.    Sitting:     Static: good     Dynamic: good   Standing: fair  with wheeled walker   Activity Tolerance fair   good    Visual/  Perceptual Glasses: Yes                Hand Dominance: Right     AROM (PROM) Strength Additional Info:  Goal:   RUE  WFL 4-/5 good  and wfl FMC/dexterity noted during ADL tasks   Improve overall RUE strength  for participation in functional tasks   LUE WFL 4-/5 good  and wfl FMC/dexterity noted during ADL tasks   Improve overall LUE strength  for participation in functional tasks      Vitals:  HR at

## 2024-05-28 NOTE — PROGRESS NOTES
Progress Note  Date:2024       Room:0521/0521-01  Patient Name:Nikita Archuleta     YOB: 1940     Age:84 y.o.        Subjective    Subjective:  Symptoms:  Stable.  No shortness of breath, cough, chest pain, weakness or diarrhea.  (Lethargic but awake and talking but disoriented ).    Diet:  NPO.  No nausea.    Activity level: Impaired due to weakness.       Review of Systems   Constitutional:  Positive for fatigue. Negative for appetite change, chills, fever and unexpected weight change.   HENT:  Negative for congestion, facial swelling, mouth sores, rhinorrhea and sinus pain.    Eyes:  Negative for visual disturbance.   Respiratory:  Negative for cough, chest tightness, shortness of breath and wheezing.    Cardiovascular:  Negative for chest pain and leg swelling.   Gastrointestinal:  Negative for abdominal distention, abdominal pain, blood in stool, constipation, diarrhea and nausea.   Genitourinary:  Negative for difficulty urinating, dysuria, frequency and urgency.   Musculoskeletal:  Negative for joint swelling.   Skin:  Positive for wound. Negative for rash.   Neurological:  Negative for dizziness, syncope, weakness, light-headedness and headaches.   Psychiatric/Behavioral:  Negative for behavioral problems, confusion and hallucinations.      Objective         Vitals Last 24 Hours:  TEMPERATURE:  Temp  Av.7 °F (36.5 °C)  Min: 97.4 °F (36.3 °C)  Max: 97.8 °F (36.6 °C)  RESPIRATIONS RANGE: Resp  Av.7  Min: 16  Max: 26  PULSE OXIMETRY RANGE: SpO2  Av.3 %  Min: 93 %  Max: 100 %  PULSE RANGE: Pulse  Av.8  Min: 70  Max: 100  BLOOD PRESSURE RANGE: Systolic (24hrs), Av , Min:113 , Max:151   ; Diastolic (24hrs), Av, Min:49, Max:61    I/O (24Hr):    Intake/Output Summary (Last 24 hours) at 2024 1836  Last data filed at 2024 1750  Gross per 24 hour   Intake --   Output 1075 ml   Net -1075 ml     Objective:  General Appearance:  In no acute distress (confused , but   significant cardiac motion artifact the limits evaluation for an RV-LV ratio.  There is however reflux of contrast into the inferior vena cava and this may indicate signs of underlying pulmonary hypertension. Organs: The liver revealed a homogeneous density.  There are no signs of a discrete mass or duct dilation.  The gallbladder is surgically absent. Pancreas demonstrates slight duct dilation but this is nonspecific.  Pancreas and adrenal glands revealed no acute abnormalities. Each kidney appears normal in size shape and position without stones or hydronephrosis.  Extending from the inferior pole the right kidney there is an exophytic cyst measuring 3.6 cm.  The ureter is dilated bilaterally with signs of bilateral hydroureter.  The urinary bladder is markedly distended. 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  The largest lesion to the right of the midline anteriorly measures 31 x 25 mm and posteriorly to the left the largest lesion measures 22 x 26 mm.  Direct visualization may be helpful in further characterization. GI/Bowel: No acute abnormalities of the stomach were observed.  The small bowel pattern is nonobstructive.  The appendix cecum and terminal ileum appear within the normal range.  There is a moderate volume of retained fecal debris within the colon.  There is no CT evidence of active GI bleed Pelvis:   The prostate gland measures 4.6 cm that is felt to be at the upper limits of normal.  As noted above the urinary bladder is markedly distended suggestive of a bladder outlet obstructive process. Peritoneum/Retroperitoneum: There are no signs of retroperitoneal lymphadenopathy or aneurysm. Bones/Soft Tissues: No discrete osteolytic or blastic lesions are identified.     1. Marked distention of the urinary bladder concerning for a bladder outlet obstructive process. 2. There are multiple

## 2024-05-28 NOTE — PROGRESS NOTES
Speech Language Pathology  NAME:  Nikita Archuleta  :  1940  DATE: 2024  ROOM:  0521/0521-01    Pt unavailable at 1:08 pm for Clinical Swallow Evaluation     REASON:  Off unit for testing/ procedure ultra sound    Will re-attempt as appropriate.       Thank You    Cheyanne Ortiz MSCCC/SLP  Speech Language Pathologist  [phone]

## 2024-05-28 NOTE — FLOWSHEET NOTE
Inpatient Wound Care (Initial consult) [phone]    Admit Date: 5/26/2024  4:27 PM    Reason for consult:  Wound    Significant history:  Per H&P    CHIEF COMPLAINT:  melena     History of Present Illness:  84 y.o. male with a history of dementia, HLD presents from facility with multiple episodes of melena and agitation.  Patient unable to provide history.  Workup in ED significant for Na 150 (up from 141 a few days ago), creatinine 1.4 (up from 0.9), lactic 4.2, troponin 87 and 90, ALT 58, AST 67, WBC 16.3, hgb 16.7, repeat 16.0.  CT abd/pelvis bladder outlet obstruction, multiple bladder wall masses (diagnosed while admitted 5/11-5/16 with UTI, has not followed up outpatient yet), constipation.  CTA chest RLL PE, LLL aspiration pneumonia.    Findings:     05/28/24 1119   Skin Integumentary    Skin Integrity Ecchymosis   Location BUE   Wound 05/27/24 Sacrum   Date First Assessed/Time First Assessed: 05/27/24 1730   Present on Original Admission: Yes  Primary Wound Type: Pressure Injury  Location: Sacrum   Wound Image    Wound Etiology Deep tissue/Injury   Dressing/Treatment Pharmaceutical agent (see MAR)   Wound Length (cm) 4 cm   Wound Width (cm) 0.2 cm   Wound Depth (cm) 0.1 cm   Wound Surface Area (cm^2) 0.8 cm^2   Change in Wound Size % (l*w) 36   Wound Volume (cm^3) 0.08 cm^3   Wound Healing % 36   Wound Assessment Pink/red;Purple/maroon   Drainage Amount Scant (moist but unmeasurable)   Drainage Description Serosanguinous   Odor None   Gertrudis-wound Assessment Fragile   Wound 05/27/24 Buttocks Right   Date First Assessed/Time First Assessed: 05/27/24 1730   Present on Original Admission: Yes  Primary Wound Type: Pressure Injury  Location: Buttocks  Wound Location Orientation: Right   Wound Image   (see photo for buttocks)   Wound Etiology Deep tissue/Injury   Dressing/Treatment Pharmaceutical agent (see MAR)   Wound Length (cm) 11 cm   Wound Width (cm) 6 cm   Wound Depth (cm)   (Unable to determine)   Wound Surface

## 2024-05-28 NOTE — PROGRESS NOTES
Pharmacy Note - Extended Infusion Beta-Lactam Adjustment    Meropenem 1g Q8h for treatment of Bloodstream infection/UTI Per [facility] Extended Infusion Beta-Lactam Policy, meropenem will be changed to 1000mg load followed by 1000mg Q8h extended infusion        Please call with any questions.    Thank you,    Bre Villarreal, [facility]

## 2024-05-28 NOTE — PROGRESS NOTES
PROGRESS NOTE  By Jacob Whitt M.D.    [facility]  Dr. Zuleyka Canseco M.D.,  Dr. Filiberto Rutherford M.D.,   Dr. Steve Sam D.O.,  Dr. Bucky Alfaro M.D.,  YVONNE ButlerO.,          Nikita DALLIN Archuleta  84 y.o.  male    SUBJECTIVE:  No new complaints.  Patient is confused but appears to be denying abdominal pain.  Per nursing sacral wound    OBJECTIVE:    BP (!) 119/49   Pulse 77   Temp 97.7 °F (36.5 °C) (Axillary)   Resp 17   Wt 74.8 kg (165 lb)   SpO2 93%   BMI 22.38 kg/m²     General: NAD/older adult  male.  Confused  HEENT: Anicteric sclera/moist oral mucosa/no discharge from nose or ears  Neck: Supple/trachea is midline  Chest: CTAB/symmetrical excursions  Cor: Regular/S1-S2  Abd.: Soft.  Nondistended.  Nontender.  No guarding.  BS +/4.  Extr.:  No significant peripheral edema.  Decreased muscle tone and bulk throughout, consistent with age and condition  Skin: Warm and dry/anicteric      DATA:    Monitor data reviewed - n sinus rhythm oted.       Lab Results   Component Value Date/Time    WBC 16.3 05/26/2024 06:36 PM    RBC 5.29 05/26/2024 06:36 PM    HGB 14.1 05/27/2024 11:53 PM    HCT 44.2 05/27/2024 11:53 PM    MCV 95.1 05/26/2024 06:36 PM    MCH 31.6 05/26/2024 06:36 PM    MCHC 33.2 05/26/2024 06:36 PM    RDW 13.3 05/26/2024 06:36 PM     05/26/2024 06:36 PM    MPV 11.2 05/26/2024 06:36 PM     Lab Results   Component Value Date/Time     05/27/2024 08:22 PM    K 4.1 05/27/2024 08:22 PM     05/27/2024 08:22 PM    CO2 27 05/27/2024 08:22 PM    BUN 50 05/27/2024 08:22 PM    CREATININE 1.0 05/27/2024 08:22 PM    CALCIUM 9.6 05/27/2024 08:22 PM    BILITOT 0.4 05/26/2024 06:36 PM    ALKPHOS 95 05/26/2024 06:36 PM    AST 67 05/26/2024 06:36 PM    ALT 58 05/26/2024 06:36 PM     Lab Results   Component Value Date/Time    LIPASE 42 05/26/2024 06:36 PM     No results found for: \"AMYLASE\"      ASSESSMENT/PLAN:  Patient Active Problem List   Diagnosis    Left cataract     UTI (urinary tract infection)    GI bleed    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 subsegmental pulmonary embolism without acute cor pulmonale (HCC)    Community acquired pneumonia of left lower lobe of lung    Bladder outlet obstruction     Rectal bleed  -Reported rectal bleeding prior to arrival  -Consider constipation with hemorrhoidal versus diverticular bleeding, see constipation section -possible bleeding from decubitus ulcer  -Hemoglobin currently remains in normal range  -Monitor hemoglobin every 8 to 12 hours-Keep PRBCs on hold  -Defer transfusion to admitting  -Cannot rule out brisk upper GI bleeding  -PPI twice daily  -Consider patient for EGD  -Consider patient for colonoscopy     2.  Elevated LFTs  -Nonobstructive liver profile  -CT abdomen pelvis 5/26/2024 showing unremarkable appearing liver without evidence of mass, normal caliber CBD, absent gallbladder, slight dilation of the pancreatic duct  -Obtain acute hepatitis panel  -Obtain tumor markers  -Evaluate iron studies  -Consideration for MRI/MRCP versus triphasic CT     3.  Abnormal CT pancreas  -CT abdomen pelvis 5/26/2024 showing unremarkable appearing liver without evidence of mass, normal caliber CBD, absent gallbladder, slight dilation of the pancreatic duct  -Elevated CEA at 9.9  -Consideration for MRI/MRCP versus triphasic CT -patient unlikely to be able to cooperate with MRI given altered mental status -given improved renal failure, will consider triphasic CT tomorrow     4.  Acute pulmonary embolism  -CTA chest 5/26/2024 showing pulmonary embolism right lower lung  -Per admitting/pertinent consultants     5.  Constipation  -CT abdomen pelvis 5/26/2024 showing moderate stool throughout the colon  -MiraLAX daily  -Senokot nightly as needed  -Can titrate bowel regimen as needed     6.  Bladder mass  -Urine cytology 5/14/2024 showing specimen positive for high-grade urothelial

## 2024-05-29 ENCOUNTER — APPOINTMENT (OUTPATIENT)
Dept: CT IMAGING | Age: 84
End: 2024-05-29
Payer: MEDICARE

## 2024-05-29 PROBLEM — E44.0 MODERATE PROTEIN-CALORIE MALNUTRITION (HCC): Chronic | Status: ACTIVE | Noted: 2024-05-29

## 2024-05-29 LAB
ALBUMIN SERPL-MCNC: 2.9 G/DL (ref 3.5–5.2)
ALP SERPL-CCNC: 75 U/L (ref 40–129)
ALT SERPL-CCNC: 67 U/L (ref 0–40)
ANION GAP SERPL CALCULATED.3IONS-SCNC: 10 MMOL/L (ref 7–16)
ANION GAP SERPL CALCULATED.3IONS-SCNC: 7 MMOL/L (ref 7–16)
ANION GAP SERPL CALCULATED.3IONS-SCNC: 8 MMOL/L (ref 7–16)
ANION GAP SERPL CALCULATED.3IONS-SCNC: 9 MMOL/L (ref 7–16)
AST SERPL-CCNC: 65 U/L (ref 0–39)
B PARAP IS1001 DNA NPH QL NAA+NON-PROBE: NOT DETECTED
B PERT DNA SPEC QL NAA+PROBE: NOT DETECTED
BASOPHILS # BLD: 0.06 K/UL (ref 0–0.2)
BASOPHILS NFR BLD: 1 % (ref 0–2)
BILIRUB SERPL-MCNC: 0.5 MG/DL (ref 0–1.2)
BUN SERPL-MCNC: 15 MG/DL (ref 6–23)
BUN SERPL-MCNC: 15 MG/DL (ref 6–23)
BUN SERPL-MCNC: 18 MG/DL (ref 6–23)
BUN SERPL-MCNC: 23 MG/DL (ref 6–23)
C PNEUM DNA NPH QL NAA+NON-PROBE: NOT DETECTED
CALCIUM SERPL-MCNC: 8 MG/DL (ref 8.6–10.2)
CALCIUM SERPL-MCNC: 8.1 MG/DL (ref 8.6–10.2)
CALCIUM SERPL-MCNC: 8.1 MG/DL (ref 8.6–10.2)
CALCIUM SERPL-MCNC: 8.4 MG/DL (ref 8.6–10.2)
CHLORIDE SERPL-SCNC: 113 MMOL/L (ref 98–107)
CHLORIDE SERPL-SCNC: 116 MMOL/L (ref 98–107)
CHLORIDE SERPL-SCNC: 119 MMOL/L (ref 98–107)
CHLORIDE SERPL-SCNC: 121 MMOL/L (ref 98–107)
CO2 SERPL-SCNC: 25 MMOL/L (ref 22–29)
CO2 SERPL-SCNC: 26 MMOL/L (ref 22–29)
CO2 SERPL-SCNC: 26 MMOL/L (ref 22–29)
CO2 SERPL-SCNC: 27 MMOL/L (ref 22–29)
CREAT SERPL-MCNC: 0.8 MG/DL (ref 0.7–1.2)
CREAT SERPL-MCNC: 0.9 MG/DL (ref 0.7–1.2)
EKG ATRIAL RATE: 110 BPM
EKG P AXIS: 79 DEGREES
EKG P-R INTERVAL: 118 MS
EKG Q-T INTERVAL: 348 MS
EKG QRS DURATION: 74 MS
EKG QTC CALCULATION (BAZETT): 470 MS
EKG R AXIS: 49 DEGREES
EKG T AXIS: 67 DEGREES
EKG VENTRICULAR RATE: 110 BPM
EOSINOPHIL # BLD: 0.26 K/UL (ref 0.05–0.5)
EOSINOPHILS RELATIVE PERCENT: 3 % (ref 0–6)
ERYTHROCYTE [DISTWIDTH] IN BLOOD BY AUTOMATED COUNT: 13.6 % (ref 11.5–15)
FLUAV RNA NPH QL NAA+NON-PROBE: NOT DETECTED
FLUBV RNA NPH QL NAA+NON-PROBE: NOT DETECTED
GFR, ESTIMATED: 81 ML/MIN/1.73M2
GFR, ESTIMATED: 87 ML/MIN/1.73M2
GFR, ESTIMATED: 87 ML/MIN/1.73M2
GFR, ESTIMATED: 88 ML/MIN/1.73M2
GLUCOSE SERPL-MCNC: 102 MG/DL (ref 74–99)
GLUCOSE SERPL-MCNC: 81 MG/DL (ref 74–99)
GLUCOSE SERPL-MCNC: 89 MG/DL (ref 74–99)
GLUCOSE SERPL-MCNC: 93 MG/DL (ref 74–99)
HADV DNA NPH QL NAA+NON-PROBE: NOT DETECTED
HCOV 229E RNA NPH QL NAA+NON-PROBE: NOT DETECTED
HCOV HKU1 RNA NPH QL NAA+NON-PROBE: NOT DETECTED
HCOV NL63 RNA NPH QL NAA+NON-PROBE: NOT DETECTED
HCOV OC43 RNA NPH QL NAA+NON-PROBE: DETECTED
HCT VFR BLD AUTO: 36.5 % (ref 37–54)
HCT VFR BLD AUTO: 39.9 % (ref 37–54)
HGB BLD-MCNC: 11.9 G/DL (ref 12.5–16.5)
HGB BLD-MCNC: 12.6 G/DL (ref 12.5–16.5)
HMPV RNA NPH QL NAA+NON-PROBE: NOT DETECTED
HPIV1 RNA NPH QL NAA+NON-PROBE: NOT DETECTED
HPIV2 RNA NPH QL NAA+NON-PROBE: NOT DETECTED
HPIV3 RNA NPH QL NAA+NON-PROBE: NOT DETECTED
HPIV4 RNA NPH QL NAA+NON-PROBE: NOT DETECTED
IMM GRANULOCYTES # BLD AUTO: 0.11 K/UL (ref 0–0.58)
IMM GRANULOCYTES NFR BLD: 1 % (ref 0–5)
L PNEUMO1 AG UR QL IA.RAPID: NEGATIVE
LYMPHOCYTES NFR BLD: 1.97 K/UL (ref 1.5–4)
LYMPHOCYTES RELATIVE PERCENT: 19 % (ref 20–42)
M PNEUMO DNA NPH QL NAA+NON-PROBE: NOT DETECTED
MCH RBC QN AUTO: 30.6 PG (ref 26–35)
MCHC RBC AUTO-ENTMCNC: 31.6 G/DL (ref 32–34.5)
MCV RBC AUTO: 96.8 FL (ref 80–99.9)
MONOCYTES NFR BLD: 0.82 K/UL (ref 0.1–0.95)
MONOCYTES NFR BLD: 8 % (ref 2–12)
NEUTROPHILS NFR BLD: 69 % (ref 43–80)
NEUTS SEG NFR BLD: 7.09 K/UL (ref 1.8–7.3)
PLATELET # BLD AUTO: 178 K/UL (ref 130–450)
PMV BLD AUTO: 11.2 FL (ref 7–12)
POTASSIUM SERPL-SCNC: 3.1 MMOL/L (ref 3.5–5)
POTASSIUM SERPL-SCNC: 3.1 MMOL/L (ref 3.5–5)
POTASSIUM SERPL-SCNC: 3.2 MMOL/L (ref 3.5–5)
POTASSIUM SERPL-SCNC: 3.3 MMOL/L (ref 3.5–5)
PROT SERPL-MCNC: 5.3 G/DL (ref 6.4–8.3)
RBC # BLD AUTO: 4.12 M/UL (ref 3.8–5.8)
RSV RNA NPH QL NAA+NON-PROBE: NOT DETECTED
RV+EV RNA NPH QL NAA+NON-PROBE: NOT DETECTED
S PNEUM AG SPEC QL: NEGATIVE
SARS-COV-2 RNA NPH QL NAA+NON-PROBE: NOT DETECTED
SODIUM SERPL-SCNC: 148 MMOL/L (ref 132–146)
SODIUM SERPL-SCNC: 151 MMOL/L (ref 132–146)
SODIUM SERPL-SCNC: 153 MMOL/L (ref 132–146)
SODIUM SERPL-SCNC: 155 MMOL/L (ref 132–146)
SPECIMEN DESCRIPTION: ABNORMAL
SPECIMEN SOURCE: NORMAL
WBC OTHER # BLD: 10.3 K/UL (ref 4.5–11.5)

## 2024-05-29 PROCEDURE — 85025 COMPLETE CBC W/AUTO DIFF WBC: CPT

## 2024-05-29 PROCEDURE — 2580000003 HC RX 258: Performed by: SPECIALIST

## 2024-05-29 PROCEDURE — 6360000002 HC RX W HCPCS: Performed by: SPECIALIST

## 2024-05-29 PROCEDURE — 74175 CTA ABDOMEN W/CONTRAST: CPT

## 2024-05-29 PROCEDURE — 80053 COMPREHEN METABOLIC PANEL: CPT

## 2024-05-29 PROCEDURE — 6370000000 HC RX 637 (ALT 250 FOR IP): Performed by: FAMILY MEDICINE

## 2024-05-29 PROCEDURE — 6360000002 HC RX W HCPCS: Performed by: FAMILY MEDICINE

## 2024-05-29 PROCEDURE — 36415 COLL VENOUS BLD VENIPUNCTURE: CPT

## 2024-05-29 PROCEDURE — 6360000004 HC RX CONTRAST MEDICATION: Performed by: RADIOLOGY

## 2024-05-29 PROCEDURE — 85014 HEMATOCRIT: CPT

## 2024-05-29 PROCEDURE — 2060000000 HC ICU INTERMEDIATE R&B

## 2024-05-29 PROCEDURE — 80048 BASIC METABOLIC PNL TOTAL CA: CPT

## 2024-05-29 PROCEDURE — 94640 AIRWAY INHALATION TREATMENT: CPT

## 2024-05-29 PROCEDURE — 6370000000 HC RX 637 (ALT 250 FOR IP): Performed by: INTERNAL MEDICINE

## 2024-05-29 PROCEDURE — 99233 SBSQ HOSP IP/OBS HIGH 50: CPT | Performed by: INTERNAL MEDICINE

## 2024-05-29 PROCEDURE — 85018 HEMOGLOBIN: CPT

## 2024-05-29 PROCEDURE — 93010 ELECTROCARDIOGRAM REPORT: CPT | Performed by: INTERNAL MEDICINE

## 2024-05-29 PROCEDURE — 6360000002 HC RX W HCPCS: Performed by: INTERNAL MEDICINE

## 2024-05-29 PROCEDURE — A4216 STERILE WATER/SALINE, 10 ML: HCPCS | Performed by: FAMILY MEDICINE

## 2024-05-29 PROCEDURE — 97110 THERAPEUTIC EXERCISES: CPT

## 2024-05-29 PROCEDURE — 2580000003 HC RX 258: Performed by: FAMILY MEDICINE

## 2024-05-29 PROCEDURE — 97161 PT EVAL LOW COMPLEX 20 MIN: CPT

## 2024-05-29 PROCEDURE — 2580000003 HC RX 258: Performed by: INTERNAL MEDICINE

## 2024-05-29 PROCEDURE — C9113 INJ PANTOPRAZOLE SODIUM, VIA: HCPCS | Performed by: FAMILY MEDICINE

## 2024-05-29 RX ORDER — POTASSIUM CHLORIDE 20 MEQ/1
40 TABLET, EXTENDED RELEASE ORAL ONCE
Status: DISCONTINUED | OUTPATIENT
Start: 2024-05-29 | End: 2024-06-01 | Stop reason: HOSPADM

## 2024-05-29 RX ADMIN — SODIUM CHLORIDE, PRESERVATIVE FREE 40 MG: 5 INJECTION INTRAVENOUS at 05:51

## 2024-05-29 RX ADMIN — IPRATROPIUM BROMIDE AND ALBUTEROL SULFATE 1 DOSE: .5; 2.5 SOLUTION RESPIRATORY (INHALATION) at 06:57

## 2024-05-29 RX ADMIN — ANORECTAL OINTMENT 1 EACH: 15.7; .44; 24; 20.6 OINTMENT TOPICAL at 08:29

## 2024-05-29 RX ADMIN — ENOXAPARIN SODIUM 70 MG: 100 INJECTION SUBCUTANEOUS at 08:27

## 2024-05-29 RX ADMIN — MEROPENEM 1000 MG: 1 INJECTION, POWDER, FOR SOLUTION INTRAVENOUS at 17:04

## 2024-05-29 RX ADMIN — SODIUM CHLORIDE, PRESERVATIVE FREE 40 MG: 5 INJECTION INTRAVENOUS at 16:56

## 2024-05-29 RX ADMIN — IPRATROPIUM BROMIDE AND ALBUTEROL SULFATE 1 DOSE: .5; 2.5 SOLUTION RESPIRATORY (INHALATION) at 10:12

## 2024-05-29 RX ADMIN — HALOPERIDOL LACTATE 1 MG: 5 INJECTION, SOLUTION INTRAMUSCULAR at 20:33

## 2024-05-29 RX ADMIN — IPRATROPIUM BROMIDE AND ALBUTEROL SULFATE 1 DOSE: .5; 2.5 SOLUTION RESPIRATORY (INHALATION) at 13:31

## 2024-05-29 RX ADMIN — ANORECTAL OINTMENT 1 EACH: 15.7; .44; 24; 20.6 OINTMENT TOPICAL at 23:29

## 2024-05-29 RX ADMIN — MEROPENEM 1000 MG: 1 INJECTION, POWDER, FOR SOLUTION INTRAVENOUS at 08:26

## 2024-05-29 RX ADMIN — DIVALPROEX SODIUM 125 MG: 125 CAPSULE ORAL at 05:52

## 2024-05-29 RX ADMIN — IPRATROPIUM BROMIDE AND ALBUTEROL SULFATE 1 DOSE: .5; 2.5 SOLUTION RESPIRATORY (INHALATION) at 17:23

## 2024-05-29 RX ADMIN — HALOPERIDOL LACTATE 1 MG: 5 INJECTION, SOLUTION INTRAMUSCULAR at 14:48

## 2024-05-29 RX ADMIN — MEROPENEM 1000 MG: 1 INJECTION, POWDER, FOR SOLUTION INTRAVENOUS at 01:33

## 2024-05-29 RX ADMIN — AZITHROMYCIN MONOHYDRATE 500 MG: 500 INJECTION, POWDER, LYOPHILIZED, FOR SOLUTION INTRAVENOUS at 20:33

## 2024-05-29 RX ADMIN — HALOPERIDOL LACTATE 1 MG: 5 INJECTION, SOLUTION INTRAMUSCULAR at 06:35

## 2024-05-29 RX ADMIN — IOPAMIDOL 75 ML: 755 INJECTION, SOLUTION INTRAVENOUS at 13:06

## 2024-05-29 RX ADMIN — DEXTROSE AND SODIUM CHLORIDE: 5; 200 INJECTION, SOLUTION INTRAVENOUS at 08:27

## 2024-05-29 RX ADMIN — DIVALPROEX SODIUM 125 MG: 125 CAPSULE ORAL at 23:30

## 2024-05-29 ASSESSMENT — ENCOUNTER SYMPTOMS
DIARRHEA: 0
SHORTNESS OF BREATH: 0
FACIAL SWELLING: 0
ABDOMINAL PAIN: 0
COUGH: 0
SINUS PAIN: 0
RHINORRHEA: 0
WHEEZING: 0
CHEST TIGHTNESS: 0
ABDOMINAL DISTENTION: 0
BLOOD IN STOOL: 0
CONSTIPATION: 0
NAUSEA: 0

## 2024-05-29 ASSESSMENT — PAIN SCALES - GENERAL
PAINLEVEL_OUTOF10: 0
PAINLEVEL_OUTOF10: 0

## 2024-05-29 NOTE — PROGRESS NOTES
Progress Note  Date:2024       Room:0521/0521-01  Patient Name:Nikita Archuleta     YOB: 1940     Age:84 y.o.        Subjective    Subjective:  Symptoms:  Stable.  No shortness of breath, cough, chest pain, weakness or diarrhea.  ( awake and talking but disoriented , frail).    Diet:  Poor intake.  No nausea.    Activity level: Impaired due to weakness.       Review of Systems   Constitutional:  Positive for fatigue. Negative for appetite change, chills, fever and unexpected weight change.   HENT:  Negative for congestion, facial swelling, mouth sores, rhinorrhea and sinus pain.    Eyes:  Negative for visual disturbance.   Respiratory:  Negative for cough, chest tightness, shortness of breath and wheezing.    Cardiovascular:  Negative for chest pain and leg swelling.   Gastrointestinal:  Negative for abdominal distention, abdominal pain, blood in stool, constipation, diarrhea and nausea.   Genitourinary:  Negative for difficulty urinating, dysuria, frequency and urgency.   Musculoskeletal:  Negative for joint swelling.   Skin:  Positive for wound. Negative for rash.   Neurological:  Negative for dizziness, syncope, weakness, light-headedness and headaches.   Psychiatric/Behavioral:  Positive for behavioral problems and confusion. Negative for hallucinations.      Objective         Vitals Last 24 Hours:  TEMPERATURE:  Temp  Av.1 °F (36.7 °C)  Min: 97.7 °F (36.5 °C)  Max: 98.2 °F (36.8 °C)  RESPIRATIONS RANGE: Resp  Av  Min: 18  Max: 26  PULSE OXIMETRY RANGE: SpO2  Av.6 %  Min: 91 %  Max: 97 %  PULSE RANGE: Pulse  Av  Min: 65  Max: 82  BLOOD PRESSURE RANGE: Systolic (24hrs), Av , Min:117 , Max:145   ; Diastolic (24hrs), Av, Min:58, Max:69    I/O (24Hr):    Intake/Output Summary (Last 24 hours) at 2024 1347  Last data filed at 2024 0503  Gross per 24 hour   Intake --   Output 1225 ml   Net -1225 ml       Objective:  General Appearance:  In no acute distress  concern for emboli 2 branches extending into the right lower lung posteriorly there is significant cardiac motion artifact the limits evaluation for an RV-LV ratio.  There is however reflux of contrast into the inferior vena cava and this may indicate signs of underlying pulmonary hypertension. Organs: The liver revealed a homogeneous density.  There are no signs of a discrete mass or duct dilation.  The gallbladder is surgically absent. Pancreas demonstrates slight duct dilation but this is nonspecific.  Pancreas and adrenal glands revealed no acute abnormalities. Each kidney appears normal in size shape and position without stones or hydronephrosis.  Extending from the inferior pole the right kidney there is an exophytic cyst measuring 3.6 cm.  The ureter is dilated bilaterally with signs of bilateral hydroureter.  The urinary bladder is markedly distended. 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  The largest lesion to the right of the midline anteriorly measures 31 x 25 mm and posteriorly to the left the largest lesion measures 22 x 26 mm.  Direct visualization may be helpful in further characterization. GI/Bowel: No acute abnormalities of the stomach were observed.  The small bowel pattern is nonobstructive.  The appendix cecum and terminal ileum appear within the normal range.  There is a moderate volume of retained fecal debris within the colon.  There is no CT evidence of active GI bleed Pelvis:   The prostate gland measures 4.6 cm that is felt to be at the upper limits of normal.  As noted above the urinary bladder is markedly distended suggestive of a bladder outlet obstructive process. Peritoneum/Retroperitoneum: There are no signs of retroperitoneal lymphadenopathy or aneurysm. Bones/Soft Tissues: No discrete osteolytic or blastic lesions are identified.     1. Marked distention of the

## 2024-05-29 NOTE — PROGRESS NOTES
Pt agitated, threatening to hit staff, swigging at staff, and cursing being verbally aggressive towards staff.

## 2024-05-29 NOTE — PROGRESS NOTES
Presumptive Negative  suggests no current or recent pneumococcal infection. Infection due to Strep pneumoniae   cannot be ruled out since the antigen present in the sample may be below the detection limit   of the test.         Culture, Blood 1 [[id number]]  (Abnormal) Collected: 05/27/24 0217    Order Status: Completed Specimen: Blood Updated: 05/29/24 1014     Specimen Description .BLOOD     Special Requests          [employer] test comment AEROBIC BLD BOTTLE     Direct Exam DIRECT GRAM STAIN FROM BOTTLE: GRAM NEGATIVE RODS     Comment: Direct Gram Stain from bottle result called to and read back by: CALLED IVONNE ULRICH RN ON   5/28/24 AT 1354           Culture GRAM NEGATIVE RODS Identification and sensitivity to follow.     Candida auris Not Detected     Candida albicans Not Detected     Candida glabrata Not Detected     Candida krusei Not Detected     Candida parapsilosis Not Detected     Candida tropicalis Not Detected     Cryptococcus neoformans/gattii Not Detected     Acinetobacter calcoaceticus-baumannii complex Not Detected     Escherichia Coli Not Detected     Enterobacter Cloacae Complex Not Detected     Enterobacterales DETECTED     Klebsiella oxytoca Not Detected     Klebsiella aerogenes Not Detected     Klebsiella pneumoniae group DETECTED     Pseudomonas aeruginosa Not Detected     Proteus spp Not Detected     Salmonella species Not Detected     SERRATIA MARCESCENS Not Detected     Stenotrophomonas maltophilia Not Detected     Haemophilus influenzae Not Detected     Listeria monocytogenes Not Detected     Bacteroides fragilis Not Detected     Neisseria Meningitidis, NMNI Not Detected     Staphylococcus spp Not Detected     Staphylococcus Aureus Not Detected     Staphylococcus epidermidis Not Detected     Staphylococcus lugdunensis Not Detected     Streptococcus spp Not Detected     Enterococcus faecalis Not Detected     Enterococcus faecium Not Detected     Streptococcus agalactiae (Group B) Not Detected      Streptococcus pneumoniae Not Detected     Streptococcus pyogenes Not Detected     Resistant gene imp by PCR Not Detected     Resistant gene ctx-m by PCR DETECTED     Resistant gene kpc by PCR Not Detected     Colistin Resistance mcr-1 gene by PCR Not Detected     Resistant gene ndm by PCR Not Detected     Resistant gene oxa-48-like by pcr Not Detected     Resistant gene vim by PCR Not Detected    Culture, Blood 2 [[id number]]  (Abnormal) Collected: 05/27/24 0217    Order Status: Completed Specimen: Blood Updated: 05/29/24 1014     Specimen Description .BLOOD     Special Requests          Direct Exam DIRECT GRAM STAIN FROM BOTTLE: GRAM NEGATIVE RODS Previous panic on this admission, call not needed per  SOP.     Culture GRAM NEGATIVE RODS Identification and sensitivity to follow.             Recent Labs     05/26/24 1836 05/29/24  0742   WBC 16.3* 10.3    178     Recent Labs     05/26/24 1836 05/27/24 2022 05/29/24  0742 05/29/24  1340   *   < > 153* 151*   K 4.0   < > 3.3* 3.2*   *   < > 119* 116*   CO2 25   < > 27 26   BUN 80*   < > 18 15   CREATININE 1.4*   < > 0.8 0.8   GLUCOSE 112*   < > 81 89   CALCIUM 9.9   < > 8.1* 8.1*   BILITOT 0.4  --  0.5  --    ALKPHOS 95  --  75  --    AST 67*  --  65*  --    ALT 58*  --  67*  --     < > = values in this interval not displayed.     No results found for: \"SEDRATE\"  Lab Results   Component Value Date    CRP 0.1 08/21/2017    CRP SEE NOTE (AA) 05/01/2017     Lab Results   Component Value Date    PROCAL 0.10 (H) 05/27/2024     Recent Labs     05/26/24 1836 05/27/24 1122 05/27/24 2022 05/28/24  0936 05/29/24  0742   PROCAL  --   --  0.10*  --   --    FERRITIN  --  384  --   --   --    INR  --   --   --  1.2  --    PROTIME  --   --   --  13.8*  --    AST 67*  --   --   --  65*   ALT 58*  --   --   --  67*     No results found for: \"MRSAC\"    Radiology:  CTA TRIPHASIC PANCREAS    Result Date: 5/29/2024  1. The abdominal aorta is again significantly

## 2024-05-29 NOTE — CONSULTS
Comprehensive Nutrition Assessment    Type and Reason for Visit:  Initial, Consult, Wound    Nutrition Recommendations/Plan:   Continue Current Diet  Continue ONS (high calorie/high protein) TID  Begin Wolf BID     Malnutrition Assessment:  Malnutrition Status:       Context:  Acute Illness     Findings of the 6 clinical characteristics of malnutrition:  Energy Intake:  Mild decrease in energy intake (Comment) (poor po intake 2-3 days prior to admission)  Weight Loss:  Unable to assess (last wt in emr 08/22/2022)     Body Fat Loss:  Moderate body fat loss Orbital, Triceps   Muscle Mass Loss:  Moderate muscle mass loss Temples (temporalis), Clavicles (pectoralis & deltoids), Calf (gastrocnemius)    Nutrition Assessment:    Pt admit w/ GI bleed, acute renal failure, bladder mass, bladder outlet obstruction, Urothelial carcinoma, Pulmonary Embolism. PMHx: Dementia, HLD. EGD and colonoscopy pending, speech evaluation pending. Pt is very confused, has sitter in room. RD consulted for multiple wounds. Per sitter pt eating 1-25% of meals, ONS ordered per MD, continue, will provide Wolf BID, continue inpatient monitoring, f/up per policy.    Nutrition Related Findings:    A/O x1, I/O -3375 since admit, abd wdl, bowel sounds active x4, Na+153, K+3.3, Chloride 119, Ca+ 8.4, elevated AST/ALT, Iron 57, TIBC 200, Wound Type: Multiple, Deep Tissue Injury (Deep tissue sacral, right and left buttocks)       Current Nutrition Intake & Therapies:    Average Meal Intake: 1-25%  Average Supplements Intake: 1-25%  ADULT ORAL NUTRITION SUPPLEMENT; Breakfast, AM Snack, Lunch, Dinner; Standard High Calorie/High Protein Oral Supplement  ADULT DIET; Dysphagia - Pureed    Anthropometric Measures:  Height: 182.9 cm (6' 0.01\")  Ideal Body Weight (IBW): 178 lbs (81 kg)    Admission Body Weight: 74.8 kg (164 lb 14.5 oz)  Current Body Weight: 74.8 kg (164 lb 14.5 oz), 92.6 % IBW. Weight Source:  (estimated 05/26/24)  Current BMI (kg/m2):

## 2024-05-29 NOTE — DISCHARGE INSTR - COC
Continuity of Care Form    Patient Name: Nikita Archuleta   :  1940  MRN:  [id number]    Admit date:  2024  Discharge date:  24    Code Status Order: Full Code   Advance Directives:     Admitting Physician:  Mary Hunt DO  PCP: Bella Amador MD    Discharging Nurse: DONITA  Discharging Hospital Unit/Room#: 0521/0521-01  Discharging Unit Phone Number: [phone]    Emergency Contact:   Extended Emergency Contact Information  Primary Emergency Contact: ArchuletaMalina  Address: [address] [place] [address]  Home Phone: [phone]  Relation: Spouse  Secondary Emergency Contact: Ebony Glass  Home Phone: [phone]  Mobile Phone: [phone]  Relation: Child  Preferred language: English   needed? No    Past Surgical History:  Past Surgical History:   Procedure Laterality Date    CHOLECYSTECTOMY      COLONOSCOPY  1990    neg    EYE SURGERY      HERNIA REPAIR      INTRACAPSULAR CATARACT EXTRACTION Left 2021    LEFT CATARACT EXTRACTION WITH IOL performed by Atilio DAVILA MD at [facility]       Immunization History:   Immunization History   Administered Date(s) Administered    COVID-19, MODERNA BLUE border, Primary or Immunocompromised, (age 12y+), IM, 100 mcg/0.5mL 2021, 2021    COVID-19, MODERNA Booster BLUE border, (age 18y+), IM, 50mcg/0.25mL 2021       Active Problems:  Patient Active Problem List   Diagnosis Code    Left cataract H26.9    UTI (urinary tract infection) N39.0    GI bleed K92.2    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

## 2024-05-29 NOTE — PLAN OF CARE
Problem: ABCDS Injury Assessment  Goal: Absence of physical injury  5/29/2024 0237 by Arlene Smith, RN  Outcome: Progressing     Problem: Skin/Tissue Integrity  Goal: Absence of new skin breakdown  Description: 1.  Monitor for areas of redness and/or skin breakdown  2.  Assess vascular access sites hourly  3.  Every 4-6 hours minimum:  Change oxygen saturation probe site  4.  Every 4-6 hours:  If on nasal continuous positive airway pressure, respiratory therapy assess nares and determine need for appliance change or resting period.  5/29/2024 0237 by Arlene Smith, RN  Outcome: Progressing      Discussed with patient EEG results, she has not had any episodes of loss of consciousness, she has had 1 episode of confusion while driving last month, I have advised the patient not to drive, would recommend a sleep-deprived EEG, I discussed with patient regarding medications, she would like to wait till we get the MRI scan of the brain and sleep-deprived EEG, I have advised her to take seizure precautions meanwhile

## 2024-05-29 NOTE — PROGRESS NOTES
PROGRESS NOTE  By Jacob Whitt M.D.    [facility]  Dr. Zuleyka Canseco M.D.,  Dr. Filiberto Rutherford M.D.,   YVONNE VillaseñorO.,  Dr. Bucky Alfaro M.D.,  YVONNE ButlerO.,          Nikitaamadou Archuleta  84 y.o.  male    SUBJECTIVE:  No new complaints but confused.    OBJECTIVE:    /61   Pulse 78   Temp 98.2 °F (36.8 °C) (Oral)   Resp 18   Wt 74.8 kg (165 lb)   SpO2 97%   BMI 22.38 kg/m²     General: NAD/older adult  male.  Confused  HEENT: Anicteric sclera/moist oral mucosa  Neck: Supple with trachea midline  Chest: Symmetric excursion/nonlabored respirations  Cor: Regular  Abd.: Soft/nontender and nondistended  Extr.:  Decreased muscle tone and bulk throughout, consistent with age and condition  Skin: Warm and dry/anicteric      DATA:    Monitor data reviewed -sinus rhythm/PVC noted.       Lab Results   Component Value Date/Time    WBC 10.3 05/29/2024 07:42 AM    RBC 4.12 05/29/2024 07:42 AM    HGB 12.6 05/29/2024 07:42 AM    HCT 39.9 05/29/2024 07:42 AM    MCV 96.8 05/29/2024 07:42 AM    MCH 30.6 05/29/2024 07:42 AM    MCHC 31.6 05/29/2024 07:42 AM    RDW 13.6 05/29/2024 07:42 AM     05/29/2024 07:42 AM    MPV 11.2 05/29/2024 07:42 AM     Lab Results   Component Value Date/Time     05/29/2024 07:42 AM    K 3.3 05/29/2024 07:42 AM     05/29/2024 07:42 AM    CO2 27 05/29/2024 07:42 AM    BUN 18 05/29/2024 07:42 AM    CREATININE 0.8 05/29/2024 07:42 AM    CALCIUM 8.1 05/29/2024 07:42 AM    BILITOT 0.5 05/29/2024 07:42 AM    ALKPHOS 75 05/29/2024 07:42 AM    AST 65 05/29/2024 07:42 AM    ALT 67 05/29/2024 07:42 AM     Lab Results   Component Value Date/Time    LIPASE 42 05/26/2024 06:36 PM     No results found for: \"AMYLASE\"      ASSESSMENT/PLAN:  Patient Active Problem List   Diagnosis    Left cataract    UTI (urinary tract infection)    GI bleed    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 subsegmental  pulmonary embolism without acute cor pulmonale (HCC)    Community acquired pneumonia of left lower lobe of lung    Bladder outlet obstruction    Acute cystitis with hematuria    Mass of bladder    Hypernatremia    Pancreatic duct dilated     Rectal bleed  -Reported rectal bleeding prior to arrival  -Consider constipation with hemorrhoidal versus diverticular bleeding, see constipation section -possible bleeding from decubitus ulcer  -Hemoglobin currently remains in normal range  -Monitor hemoglobin every 8 to 12 hours-Keep PRBCs on hold  -Defer transfusion to admitting  -Cannot rule out brisk upper GI bleeding  -PPI twice daily  -Consider patient for EGD  -Consider patient for colonoscopy     2.  Elevated LFTs  -Nonobstructive liver profile  -CT abdomen pelvis 5/26/2024 showing unremarkable appearing liver without evidence of mass, normal caliber CBD, absent gallbladder, slight dilation of the pancreatic duct  -Obtain acute hepatitis panel  -Obtain tumor markers  -Evaluate iron studies  -Consideration for MRI/MRCP versus triphasic CT     3.  Abnormal CT pancreas  -CT abdomen pelvis 5/26/2024 showing unremarkable appearing liver without evidence of mass, normal caliber CBD, absent gallbladder, slight dilation of the pancreatic duct  -Elevated CEA at 9.9  -Consideration for MRI/MRCP versus triphasic CT -patient unlikely to be able to cooperate with MRI given altered mental status -given improved renal failure, will consider triphasic CT tomorrow     4.  Acute pulmonary embolism  -CTA chest 5/26/2024 showing pulmonary embolism right lower lung  -Per admitting/pertinent consultants     5.  Constipation  -CT abdomen pelvis 5/26/2024 showing moderate stool throughout the colon  -MiraLAX daily  -Senokot nightly as needed  -Can titrate bowel regimen as needed     6.  Bladder mass  -Urine cytology 5/14/2024 showing specimen positive for high-grade urothelial carcinoma  -Per admitting/urology     7.  Comorbidities  -Per

## 2024-05-29 NOTE — PROGRESS NOTES
5/29/2024 12:53 PM  Service: Urology  Group: [facility] (Venkata/Tracie)    Nikita Archuleta  [id number]    Subjective: Afebrile  Patient continues to be disoriented.  And very frail looking.  There is no way we could intervene with a cystoscopy at this time.  He is tugging at the Boyle catheter and is having some gross hematuria without clots    Review of Systems  Respiratory: negative  Cardiovascular: negative  Gastrointestinal: negative  Hematologic/lymphatic: negative  Musculoskeletal:negative  Neurological: negative  Endocrine: negative    Scheduled Meds:   potassium chloride  40 mEq Oral Once    menthol-zinc oxide  1 each Topical BID    enoxaparin  1 mg/kg SubCUTAneous BID    meropenem  1,000 mg IntraVENous Q8H    sodium chloride flush  5-40 mL IntraVENous 2 times per day    pantoprazole (PROTONIX) 40 mg in sodium chloride (PF) 0.9 % 10 mL injection  40 mg IntraVENous Q12H    ipratropium 0.5 mg-albuterol 2.5 mg  1 Dose Inhalation Q4H WA RT    azithromycin  500 mg IntraVENous Q24H    divalproex  125 mg Oral 3 times per day       Objective:  Vitals:    05/29/24 1145   BP: (!) 117/58   Pulse: 78   Resp: 18   Temp: 98 °F (36.7 °C)   SpO2: 97%         Allergies: Patient has no known allergies.    General Appearance: alert and oriented to person, place and time and in no acute distress  Skin: no rash or erythema  Head: normocephalic and atraumatic  Pulmonary/Chest: clear to auscultation bilaterally- no wheezes, rales or rhonchi, normal air movement, no respiratory distress and no chest wall tenderness  Abdomen: soft, non-tender, non-distended, normal bowel sounds, no masses or organomegaly and no inguinal adenopathy  Genitalia: No penile or scrotal swelling or masses. Extremities: no cyanosis, clubbing or edema and Maribeth's sign negative bilaterally      Boyle well positioned and draining dark red none clotted urine urine.    Labs:     Recent Labs     05/29/24  0742   *   K 3.3*   *   CO2 27   BUN 18

## 2024-05-29 NOTE — PROGRESS NOTES
septic shock, due to unspecified organism, unspecified acute renal failure type (HCC) [A41.9, R65.20, N17.9]    VISIT DIAGNOSIS:   Visit Diagnoses         Codes    Sepsis with acute renal failure without septic shock, due to unspecified organism, unspecified acute renal failure type (HCC)     A41.9, R65.20, N17.9    Pneumonia of left lower lobe due to infectious organism     J18.9    Bladder mass     N32.89             PATIENT REPORT/COMPLAINT: patient not able to accurately report  RN cleared patient for participation in assessment     yes     PRIOR LEVEL OF SWALLOW FUNCTION:    PAST HISTORY OF OROPHARYNGEAL DYSPHAGIA?: none reported    Home diet: Regular consistency solids (IDDSI level 7) with  thin liquids (IDDSI level 0)  Current Diet Order:  ADULT DIET; Clear Liquid    PROCEDURE:  Consistencies Administered During the Evaluation   Liquids: thin liquid   Solids:  Pureed  and Hard solid      Method of Intake:   straw, spoon  Self fed, Hand over hand assist      Position:   Sitting in bed with head elevated above 75 degrees    CLINICAL ASSESSMENT:  Oral Stage:       Impaired oral initiation  Decreased mastication due to:  cognitive function--patient did not recognize that he had a solid item in his mouth despite him holding on to the cookie and being assisted with placing it in his mouth.  He made not attempt to chew the item and it had to be manually removed from his mouth.  Slow bolus formation of puree as well but he did at least demonstrate awareness it was in his mouth and swallow it.       Pharyngeal Stage:    No signs of aspiration were noted during this evaluation however, silent aspiration cannot be ruled out at bedside.  If silent aspiration is suspected, a Videofluoroscopic Study of Swallowing (MBS) is recommended and requires a physician order.    Cognition:   Follows 1 - step directions appropriate for this assessment, Confusion noted, Attention impaired, , and Language impaired,     Oral Peripheral  Examination   Generalized oral weakness    Current Respiratory Status    room air     Parameters of Speech Production  Respiration:  Adequate for speech production  Quality:   Within functional limits  Intensity: Within functional limits    Volitional Swallow: Unable to elicit despite cueing     Volitional Cough:  Unable to elicit despite cueing     Pain: No pain reported.    EDUCATION:   The Speech Language Pathologist (SLP) completed education regarding results of evaluation and that intervention is warranted at this time.  Learner: Patient  Education: Reviewed results and recommendations of this evaluation  Evaluation of Education:  No evidence of learning    This plan may be re-evaluated and revised as warranted.      Evaluation Time includes thorough review of current medical information, gathering information on past medical history/social history and prior level of function, completion of standardized testing/informal observation of tasks, assessment of data and education on plan of care and goals.    [x]The admitting diagnosis and active problem list, have been reviewed prior to initiation of this evaluation.        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

## 2024-05-29 NOTE — PROGRESS NOTES
Physician Progress Note      PATIENT:               TESSY JALLOH  CSN #:                  [id number]  :                       1940  ADMIT DATE:       2024 4:27 PM  DISCH DATE:  RESPONDING  PROVIDER #:        Yamila Camp MD          QUERY TEXT:    Dear ,    Pt admitted with pneumonia and UTI.  Noted documentation of sepsis on  ED   note per ED provider.  If possible, please document in progress notes and   discharge summary:    The medical record reflects the following:  Risk Factors:  Sacrum and Right buttock: DTI,  Sacrum and Right buttock: DTI  Left buttock: Stage 1, pneumonia  Clinical Indicators: WBC 16.3; lactic 4.2; procalcitonin 0.10; BC x1 +   Klebsiella and enterobacterales; wound coccyx cx: gram +cocci; CTA Chest:   There is development of areas of a bronchial pneumonia or aspiration or  combination of both possibilities in the posterior basal region of the left   lower lobe as new development since the study of May 14 which superimposed   areas chronic subpleural fibrosis and atelectasis.; TPR:   98.7-97.4/121-65/33-16  Treatment: PCU monitoring, GI, Wound Care Nurse and ID consults, blood and   urine cx's, IV NSS 1.5L bolus in ED, IV Rocephin, Vibramycin, Zosyn,   Zithromax, Merrem    Thank You,  Tracie Kaufman RN, BSN, CDS  Clinical Documentation Improvement Specialist  [phone]  Options provided:  -- Sepsis confirmed present on admission  -- Sepsis ruled out  -- Other - I will add my own diagnosis  -- Disagree - Not applicable / Not valid  -- Disagree - Clinically unable to determine / Unknown  -- Refer to Clinical Documentation Reviewer    PROVIDER RESPONSE TEXT:    The diagnosis of sepsis was confirmed as present on admission.    Query created by: Tracie Kaufman on 2024 9:56 AM      Electronically signed by:  Yamila Camp MD 2024 1:46 PM

## 2024-05-29 NOTE — PROGRESS NOTES
endurance impairing functional mobility, transfers, gait distance, and tolerance to activity. Pt largely limited by confusion. Pt sitting EOB with max A, declined to stand, impulsively laying back down. The more talking and engaging with the patient, the more the patient becomes agitated.  The patient will benefit from continued skilled therapy to increase strength and improve balance for safe functional mobility, to decrease risk of falls, and to meet goals at discharge.         PHYSICAL THERAPY  PLAN OF CARE       Physical therapy plan of care is established based on physician order,  patient diagnosis and clinical assessment    Current Treatment Recommendations:    -Bed Mobility: Lower and upper extremity exercises, and trunk control activities  -Sitting Balance: Incorporate reaching activities to activate trunk muscles , Hands on support to maintain midline , and Facilitate active trunk muscle engagement   -Standing Balance: Perform strengthening exercises in standing to promote motor control with or without upper extremity support , Instruct patient on adequate base of support to maintain balance, and Challenge balance utilizing reaching  activities beyond center of gravity    -Transfers: Provide instruction on proper hand and foot position for adequate transfer of weight onto lower extremities and use of gait device if needed and Cues for hand placement, technique and safety. Provide stabilization to prevent fall   -Gait: Gait training and Standing activities to improve: base of support, weight shift, weight bearing      PT long term treatment goals are located in below grid    Patient and or family understand(s) diagnosis, prognosis, and plan of care.    Frequency of treatments: Patient will be seen  daily.         Prior Level of Function: Patient ambulated with wheeled walker   Rehab Potential: good - for baseline    Past medical history:   Past Medical History:   Diagnosis Date    Hyperlipidemia      Past  Sit to stand: Minimal assist of 1    Ambulation    not assessed     15 feet using  wheeled walker with Minimal assist of 1    Stair negotiation: ascended and descended   Not assessed         ROM Within functional limits    Increase range of motion 10% of affected joints    Strength BUE:  refer to OT eval  RLE:  3/5  LLE:  3/5  Increase strength in affected mm groups by 1/3 grade   Balance Sitting EOB:  fair   Dynamic Standing:  not assessed   Sitting EOB:  good   Dynamic Standing: good      Patient is Alert & Oriented x reponds to name and follows one step directions less than 50% of time   Sensation:  Patient  denies numbness/tingling   Edema:  no   Endurance: fair      Vitals: room air   Blood Pressure at rest  Blood Pressure during session    Heart Rate at rest  Heart Rate during session    SPO2 at rest %  SPO2 during session %     Patient education  Patient educated on role of Physical Therapy, risks of immobility, safety and plan of care,  importance of mobility while in hospital , ankle pumps, quad set and glut set for edema control, blood clot prevention, and safety      Patient response to education:   Pt verbalized understanding Pt demonstrated skill Pt requires further education in this area   Yes Partial Yes      Treatment:  Patient practiced and was instructed/facilitated in the following treatment: Patient assisted to EOB. Sat edge of bed 15 minutes with Moderate assist of 1 to increase dynamic sitting balance and activity tolerance. Pt required multiple redirection cueing and tactile cues to participate.Pt impulsively trying to lay back down multiple times. Eventually assisting patient to supine and to HOB. Performed exercises with max tactile cues.      Therapeutic Exercises:  ankle pumps, heel slide, hip abduction/adduction, and straight leg raise  x 15 reps.       At end of session, patient in bed with alarm call light and phone within reach,  all lines and tubes intact, nursing notified.

## 2024-05-30 LAB
ACB COMPLEX DNA BLD POS QL NAA+NON-PROBE: NOT DETECTED
AFP SERPL-MCNC: 2.7 UG/L
ALBUMIN SERPL-MCNC: 2.4 G/DL (ref 3.5–5.2)
ALP SERPL-CCNC: 67 U/L (ref 40–129)
ALT SERPL-CCNC: 57 U/L (ref 0–40)
ANION GAP SERPL CALCULATED.3IONS-SCNC: 6 MMOL/L (ref 7–16)
AST SERPL-CCNC: 47 U/L (ref 0–39)
B FRAGILIS DNA BLD POS QL NAA+NON-PROBE: NOT DETECTED
BASOPHILS # BLD: 0.03 K/UL (ref 0–0.2)
BASOPHILS NFR BLD: 0 % (ref 0–2)
BILIRUB SERPL-MCNC: 0.5 MG/DL (ref 0–1.2)
BIOFIRE TEST COMMENT: ABNORMAL
BLACTX-M ISLT/SPM QL: DETECTED
BLAIMP ISLT/SPM QL: NOT DETECTED
BLAKPC ISLT/SPM QL: NOT DETECTED
BLAOXA-48-LIKE ISLT/SPM QL: NOT DETECTED
BLAVIM ISLT/SPM QL: NOT DETECTED
BUN SERPL-MCNC: 12 MG/DL (ref 6–23)
C ALBICANS DNA BLD POS QL NAA+NON-PROBE: NOT DETECTED
C AURIS DNA BLD POS QL NAA+NON-PROBE: NOT DETECTED
C GATTII+NEOFOR DNA BLD POS QL NAA+N-PRB: NOT DETECTED
C GLABRATA DNA BLD POS QL NAA+NON-PROBE: NOT DETECTED
C KRUSEI DNA BLD POS QL NAA+NON-PROBE: NOT DETECTED
C PARAP DNA BLD POS QL NAA+NON-PROBE: NOT DETECTED
C TROPICLS DNA BLD POS QL NAA+NON-PROBE: NOT DETECTED
CALCIUM SERPL-MCNC: 7.8 MG/DL (ref 8.6–10.2)
CANCER AG19-9 SERPL IA-ACNC: <2 U/ML (ref 0–35)
CHLORIDE SERPL-SCNC: 112 MMOL/L (ref 98–107)
CO2 SERPL-SCNC: 26 MMOL/L (ref 22–29)
COLISTIN RES MCR-1 ISLT/SPM QL: NOT DETECTED
CREAT SERPL-MCNC: 0.7 MG/DL (ref 0.7–1.2)
E CLOAC COMP DNA BLD POS NAA+NON-PROBE: NOT DETECTED
E COLI DNA BLD POS QL NAA+NON-PROBE: NOT DETECTED
E FAECALIS DNA BLD POS QL NAA+NON-PROBE: NOT DETECTED
E FAECIUM DNA BLD POS QL NAA+NON-PROBE: NOT DETECTED
ENTEROBACTERALES DNA BLD POS NAA+N-PRB: DETECTED
EOSINOPHIL # BLD: 0.23 K/UL (ref 0.05–0.5)
EOSINOPHILS RELATIVE PERCENT: 3 % (ref 0–6)
ERYTHROCYTE [DISTWIDTH] IN BLOOD BY AUTOMATED COUNT: 13.3 % (ref 11.5–15)
GFR, ESTIMATED: 89 ML/MIN/1.73M2
GLUCOSE SERPL-MCNC: 100 MG/DL (ref 74–99)
GP B STREP DNA BLD POS QL NAA+NON-PROBE: NOT DETECTED
HAEM INFLU DNA BLD POS QL NAA+NON-PROBE: NOT DETECTED
HCT VFR BLD AUTO: 35.3 % (ref 37–54)
HCT VFR BLD AUTO: 36.3 % (ref 37–54)
HGB BLD-MCNC: 11.6 G/DL (ref 12.5–16.5)
HGB BLD-MCNC: 11.9 G/DL (ref 12.5–16.5)
IMM GRANULOCYTES # BLD AUTO: 0.07 K/UL (ref 0–0.58)
IMM GRANULOCYTES NFR BLD: 1 % (ref 0–5)
K OXYTOCA DNA BLD POS QL NAA+NON-PROBE: NOT DETECTED
KLEBSIELLA SP DNA BLD POS QL NAA+NON-PRB: DETECTED
KLEBSIELLA SP DNA BLD POS QL NAA+NON-PRB: NOT DETECTED
L MONOCYTOG DNA BLD POS QL NAA+NON-PROBE: NOT DETECTED
LYMPHOCYTES NFR BLD: 1.75 K/UL (ref 1.5–4)
LYMPHOCYTES RELATIVE PERCENT: 20 % (ref 20–42)
MCH RBC QN AUTO: 31 PG (ref 26–35)
MCHC RBC AUTO-ENTMCNC: 32.9 G/DL (ref 32–34.5)
MCV RBC AUTO: 94.4 FL (ref 80–99.9)
MICROORGANISM SPEC CULT: ABNORMAL
MICROORGANISM/AGENT SPEC: ABNORMAL
MONOCYTES NFR BLD: 0.76 K/UL (ref 0.1–0.95)
MONOCYTES NFR BLD: 9 % (ref 2–12)
MYCOPLASMA AB,IGM: NORMAL
N MEN DNA BLD POS QL NAA+NON-PROBE: NOT DETECTED
NEUTROPHILS NFR BLD: 68 % (ref 43–80)
NEUTS SEG NFR BLD: 5.89 K/UL (ref 1.8–7.3)
P AERUGINOSA DNA BLD POS NAA+NON-PROBE: NOT DETECTED
PLATELET # BLD AUTO: 160 K/UL (ref 130–450)
PMV BLD AUTO: 11.3 FL (ref 7–12)
POTASSIUM SERPL-SCNC: 2.9 MMOL/L (ref 3.5–5)
PROT SERPL-MCNC: 4.5 G/DL (ref 6.4–8.3)
PROTEUS SP DNA BLD POS QL NAA+NON-PROBE: NOT DETECTED
RBC # BLD AUTO: 3.74 M/UL (ref 3.8–5.8)
RESISTANT GENE NDM BY PCR: NOT DETECTED
S AUREUS DNA BLD POS QL NAA+NON-PROBE: NOT DETECTED
S AUREUS+CONS DNA BLD POS NAA+NON-PROBE: NOT DETECTED
S EPIDERMIDIS DNA BLD POS QL NAA+NON-PRB: NOT DETECTED
S LUGDUNENSIS DNA BLD POS QL NAA+NON-PRB: NOT DETECTED
S MALTOPHILIA DNA BLD POS QL NAA+NON-PRB: NOT DETECTED
S MARCESCENS DNA BLD POS NAA+NON-PROBE: NOT DETECTED
S PNEUM DNA BLD POS QL NAA+NON-PROBE: NOT DETECTED
S PYO DNA BLD POS QL NAA+NON-PROBE: NOT DETECTED
SALMONELLA DNA BLD POS QL NAA+NON-PROBE: NOT DETECTED
SERVICE CMNT-IMP: ABNORMAL
SERVICE CMNT-IMP: ABNORMAL
SODIUM SERPL-SCNC: 144 MMOL/L (ref 132–146)
SPECIMEN DESCRIPTION: ABNORMAL
STREPTOCOCCUS DNA BLD POS NAA+NON-PROBE: NOT DETECTED
WBC OTHER # BLD: 8.7 K/UL (ref 4.5–11.5)

## 2024-05-30 PROCEDURE — 97530 THERAPEUTIC ACTIVITIES: CPT

## 2024-05-30 PROCEDURE — 6360000002 HC RX W HCPCS: Performed by: SPECIALIST

## 2024-05-30 PROCEDURE — 99222 1ST HOSP IP/OBS MODERATE 55: CPT

## 2024-05-30 PROCEDURE — 2580000003 HC RX 258: Performed by: INTERNAL MEDICINE

## 2024-05-30 PROCEDURE — 6370000000 HC RX 637 (ALT 250 FOR IP): Performed by: FAMILY MEDICINE

## 2024-05-30 PROCEDURE — A4216 STERILE WATER/SALINE, 10 ML: HCPCS | Performed by: FAMILY MEDICINE

## 2024-05-30 PROCEDURE — 85014 HEMATOCRIT: CPT

## 2024-05-30 PROCEDURE — 2580000003 HC RX 258: Performed by: FAMILY MEDICINE

## 2024-05-30 PROCEDURE — 6360000002 HC RX W HCPCS: Performed by: FAMILY MEDICINE

## 2024-05-30 PROCEDURE — 6360000002 HC RX W HCPCS: Performed by: INTERNAL MEDICINE

## 2024-05-30 PROCEDURE — 97110 THERAPEUTIC EXERCISES: CPT

## 2024-05-30 PROCEDURE — C9113 INJ PANTOPRAZOLE SODIUM, VIA: HCPCS | Performed by: FAMILY MEDICINE

## 2024-05-30 PROCEDURE — 97535 SELF CARE MNGMENT TRAINING: CPT

## 2024-05-30 PROCEDURE — 85025 COMPLETE CBC W/AUTO DIFF WBC: CPT

## 2024-05-30 PROCEDURE — 94640 AIRWAY INHALATION TREATMENT: CPT

## 2024-05-30 PROCEDURE — 99233 SBSQ HOSP IP/OBS HIGH 50: CPT | Performed by: INTERNAL MEDICINE

## 2024-05-30 PROCEDURE — 85018 HEMOGLOBIN: CPT

## 2024-05-30 PROCEDURE — 2060000000 HC ICU INTERMEDIATE R&B

## 2024-05-30 PROCEDURE — 36415 COLL VENOUS BLD VENIPUNCTURE: CPT

## 2024-05-30 PROCEDURE — 2580000003 HC RX 258: Performed by: SPECIALIST

## 2024-05-30 PROCEDURE — 92526 ORAL FUNCTION THERAPY: CPT | Performed by: SPEECH-LANGUAGE PATHOLOGIST

## 2024-05-30 PROCEDURE — 80053 COMPREHEN METABOLIC PANEL: CPT

## 2024-05-30 RX ORDER — POTASSIUM CHLORIDE 7.45 MG/ML
10 INJECTION INTRAVENOUS ONCE
Status: COMPLETED | OUTPATIENT
Start: 2024-05-30 | End: 2024-05-30

## 2024-05-30 RX ORDER — POTASSIUM CHLORIDE 7.45 MG/ML
10 INJECTION INTRAVENOUS
Status: DISCONTINUED | OUTPATIENT
Start: 2024-05-30 | End: 2024-05-30 | Stop reason: CLARIF

## 2024-05-30 RX ADMIN — MEROPENEM 1000 MG: 1 INJECTION, POWDER, FOR SOLUTION INTRAVENOUS at 01:43

## 2024-05-30 RX ADMIN — DIVALPROEX SODIUM 125 MG: 125 CAPSULE ORAL at 06:04

## 2024-05-30 RX ADMIN — POTASSIUM CHLORIDE 40 MEQ: 2 INJECTION, SOLUTION, CONCENTRATE INTRAVENOUS at 12:24

## 2024-05-30 RX ADMIN — DIVALPROEX SODIUM 125 MG: 125 CAPSULE ORAL at 14:03

## 2024-05-30 RX ADMIN — IPRATROPIUM BROMIDE AND ALBUTEROL SULFATE 1 DOSE: .5; 2.5 SOLUTION RESPIRATORY (INHALATION) at 09:13

## 2024-05-30 RX ADMIN — ANORECTAL OINTMENT 1 EACH: 15.7; .44; 24; 20.6 OINTMENT TOPICAL at 09:11

## 2024-05-30 RX ADMIN — POTASSIUM CHLORIDE 10 MEQ: 7.46 INJECTION, SOLUTION INTRAVENOUS at 21:00

## 2024-05-30 RX ADMIN — DEXTROSE AND SODIUM CHLORIDE: 5; 200 INJECTION, SOLUTION INTRAVENOUS at 06:05

## 2024-05-30 RX ADMIN — ANORECTAL OINTMENT 1 EACH: 15.7; .44; 24; 20.6 OINTMENT TOPICAL at 21:09

## 2024-05-30 RX ADMIN — IPRATROPIUM BROMIDE AND ALBUTEROL SULFATE 1 DOSE: .5; 2.5 SOLUTION RESPIRATORY (INHALATION) at 17:58

## 2024-05-30 RX ADMIN — Medication 10 ML: at 09:12

## 2024-05-30 RX ADMIN — Medication 10 ML: at 21:09

## 2024-05-30 RX ADMIN — ENOXAPARIN SODIUM 70 MG: 100 INJECTION SUBCUTANEOUS at 09:12

## 2024-05-30 RX ADMIN — AZITHROMYCIN MONOHYDRATE 500 MG: 500 INJECTION, POWDER, LYOPHILIZED, FOR SOLUTION INTRAVENOUS at 20:48

## 2024-05-30 RX ADMIN — SODIUM CHLORIDE, PRESERVATIVE FREE 40 MG: 5 INJECTION INTRAVENOUS at 06:04

## 2024-05-30 RX ADMIN — MEROPENEM 1000 MG: 1 INJECTION, POWDER, FOR SOLUTION INTRAVENOUS at 23:42

## 2024-05-30 RX ADMIN — SODIUM CHLORIDE, PRESERVATIVE FREE 40 MG: 5 INJECTION INTRAVENOUS at 17:41

## 2024-05-30 RX ADMIN — IPRATROPIUM BROMIDE AND ALBUTEROL SULFATE 1 DOSE: .5; 2.5 SOLUTION RESPIRATORY (INHALATION) at 14:27

## 2024-05-30 RX ADMIN — HALOPERIDOL LACTATE 1 MG: 5 INJECTION, SOLUTION INTRAMUSCULAR at 23:34

## 2024-05-30 RX ADMIN — POLYETHYLENE GLYCOL 3350 17 G: 17 POWDER, FOR SOLUTION ORAL at 09:12

## 2024-05-30 RX ADMIN — IPRATROPIUM BROMIDE AND ALBUTEROL SULFATE 1 DOSE: .5; 2.5 SOLUTION RESPIRATORY (INHALATION) at 06:25

## 2024-05-30 RX ADMIN — MEROPENEM 1000 MG: 1 INJECTION, POWDER, FOR SOLUTION INTRAVENOUS at 09:04

## 2024-05-30 RX ADMIN — POTASSIUM CHLORIDE 20 MEQ: 2 INJECTION, SOLUTION, CONCENTRATE INTRAVENOUS at 17:32

## 2024-05-30 RX ADMIN — LORAZEPAM 1 MG: 1 TABLET ORAL at 15:40

## 2024-05-30 RX ADMIN — ENOXAPARIN SODIUM 70 MG: 100 INJECTION SUBCUTANEOUS at 21:02

## 2024-05-30 RX ADMIN — DIVALPROEX SODIUM 125 MG: 125 CAPSULE ORAL at 23:34

## 2024-05-30 ASSESSMENT — ENCOUNTER SYMPTOMS
RHINORRHEA: 0
SHORTNESS OF BREATH: 0
CONSTIPATION: 0
BLOOD IN STOOL: 0
ABDOMINAL PAIN: 0
WHEEZING: 0
FACIAL SWELLING: 0
SINUS PAIN: 0
CHEST TIGHTNESS: 0
NAUSEA: 0
COUGH: 0
ABDOMINAL DISTENTION: 0
DIARRHEA: 0

## 2024-05-30 ASSESSMENT — PAIN SCALES - GENERAL: PAINLEVEL_OUTOF10: 0

## 2024-05-30 NOTE — PROGRESS NOTES
05/30/24 1450   Encounter Summary   Encounter Overview/Reason Palliative Care;Spiritual/Emotional Needs   Service Provided For Patient   Referral/Consult From Palliative Care;Rounding   Support System Spouse;Children   Last Encounter  05/30/24  (P-DL)   Complexity of Encounter Moderate   Spiritual/Emotional needs   Type Spiritual Support   Assessment/Intervention/Outcome   Assessment Calm;Coping   Intervention Active listening;Discussed belief system/Anabaptist practices/felicity;Discussed illness injury and it’s impact;Prayer (assurance of)/[place];Nurtured Hope   Outcome Comfort;Engaged in conversation;Expressed feelings, needs, and concerns;Expressed Gratitude     Nikita alert and engaged during  visit. He states he lives at a nursing facility but does have family support. He is of the [employer] felicity and welcomed the prayer support.  available for additional spiritual support as needed.     Tracie Hilton, [facility], [facility] Contact at Ext: [phone]

## 2024-05-30 NOTE — PROGRESS NOTES
Pt is confused and agitated. Pulling at IV in RUE and demanded that I stop it or he will pull it out. IV fluid of D5W and 0.2% stopped per pt request. Attempted to offer food, fluids and  repositioning. Pt continues to refuse to resume IV.

## 2024-05-30 NOTE — PROGRESS NOTES
PROGRESS NOTE  By Jacob Whitt M.D.    [facility]  Dr. Zuleyka Canseco M.D.,  Dr. Filiberto Rutherford M.D.,   Dr. Steve Sam D.O.,  Dr. Bucky Alfaro M.D.,  YVONNE ButlerO.,          Nikitaallison Archuleta  84 y.o.  male    SUBJECTIVE:  Remains confused but appears to deny abdominal pain.  Sitter at bedside    OBJECTIVE:    BP (!) 107/56   Pulse 63   Temp 97.3 °F (36.3 °C) (Axillary)   Resp 16   Ht 1.829 m (6' 0.01\")   Wt 74.8 kg (165 lb)   SpO2 93%   BMI 22.37 kg/m²     General: Older adult  male.  Confused.  NAD  HEENT: Anicteric sclera/moist oral mucosa  Neck: Supple with trachea midline  Chest: Symmetric excursion/CTAB  Cor: Regular  Abd.: Soft.  Appears nontender and nondistended  Extr.:  No significant peripheral edema  Skin: Warm and dry/anicteric.  No cyanosis        DATA:    Monitor data reviewed -sinus rhythm noted.       Lab Results   Component Value Date/Time    WBC 10.3 05/29/2024 07:42 AM    RBC 4.12 05/29/2024 07:42 AM    HGB 11.9 05/29/2024 04:07 PM    HCT 36.5 05/29/2024 04:07 PM    MCV 96.8 05/29/2024 07:42 AM    MCH 30.6 05/29/2024 07:42 AM    MCHC 31.6 05/29/2024 07:42 AM    RDW 13.6 05/29/2024 07:42 AM     05/29/2024 07:42 AM    MPV 11.2 05/29/2024 07:42 AM     Lab Results   Component Value Date/Time     05/29/2024 07:43 PM    K 3.1 05/29/2024 07:43 PM     05/29/2024 07:43 PM    CO2 25 05/29/2024 07:43 PM    BUN 15 05/29/2024 07:43 PM    CREATININE 0.8 05/29/2024 07:43 PM    CALCIUM 8.0 05/29/2024 07:43 PM    BILITOT 0.5 05/29/2024 07:42 AM    ALKPHOS 75 05/29/2024 07:42 AM    AST 65 05/29/2024 07:42 AM    ALT 67 05/29/2024 07:42 AM     Lab Results   Component Value Date/Time    LIPASE 42 05/26/2024 06:36 PM     No results found for: \"AMYLASE\"      ASSESSMENT/PLAN:  Patient Active Problem List   Diagnosis    Left cataract    UTI (urinary tract infection)    GI bleed    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, with behavioral disturbance  (HCC)    Single subsegmental pulmonary embolism without acute cor pulmonale (HCC)    Community acquired pneumonia of left lower lobe of lung    Bladder outlet obstruction    Acute cystitis with hematuria    Mass of bladder    Hypernatremia    Pancreatic duct dilated    Moderate protein-calorie malnutrition (HCC)     Rectal bleed  -Reported rectal bleeding prior to arrival  -Consider constipation with hemorrhoidal versus diverticular bleeding, see constipation section -possible bleeding from decubitus ulcer  -Hemoglobin currently remains in normal range  -Monitor hemoglobin every 8 to 12 hours-Keep PRBCs on hold  -Defer transfusion to admitting  -Cannot rule out brisk upper GI bleeding  -PPI twice daily  -Consider patient for EGD  -Consider patient for colonoscopy     2.  Elevated LFTs  -Nonobstructive liver profile  -CT abdomen pelvis 5/26/2024 showing unremarkable appearing liver without evidence of mass, normal caliber CBD, absent gallbladder, slight dilation of the pancreatic duct  -Negative acute hepatitis panel  -Elevated CEA  -No evidence of iron overload with mildly elevated ferritin and mildly decreased iron level  -Triphasic CT with severe PAD but no remark of mass     3.  Abnormal CT pancreas  -CT abdomen pelvis 5/26/2024 showing unremarkable appearing liver without evidence of mass, normal caliber CBD, absent gallbladder, slight dilation of the pancreatic duct  -Elevated CEA at 9.9 -see below regarding colonoscopy  -Consideration for MRI/MRCP versus triphasic CT -patient unlikely to be able to cooperate with MRI given altered mental status -given improved renal failure, will consider triphasic CT tomorrow     4.  Acute pulmonary embolism  -CTA chest 5/26/2024 showing pulmonary embolism right lower lung  -Per admitting/pertinent consultants     5.  Constipation  -CT abdomen pelvis 5/26/2024 showing moderate stool throughout the colon  -MiraLAX daily  -Senokot nightly as needed  -Can titrate bowel regimen

## 2024-05-30 NOTE — PROGRESS NOTES
OCCUPATIONAL THERAPY TREATMENT NOTE    [facility]   [address] [facility]        Date:2024  Patient Name: Nikita Archuleta  MRN: [id number]  : 1940  Room: [address] [place] [address]      Evaluating OT: Humble Batres OTR/L; #[id number]      Referring Provider and Specific Provider Orders/Date:      24   OT eval and treat  Start:  24,   End:  24,   ONE TIME,   Standing Count:  1 Occurrences,   R         Yamila Camp MD       Placement Recommendation: Subacute Rehab        Diagnosis:   1. Gastrointestinal hemorrhage, unspecified gastrointestinal hemorrhage type    2. Single subsegmental pulmonary embolism without acute cor pulmonale (HCC)    3. Sepsis with acute renal failure without septic shock, due to unspecified organism, unspecified acute renal failure type (HCC)    4. Pneumonia of left lower lobe due to infectious organism    5. Bladder mass    6. Bladder outlet obstruction    7. Gastrointestinal hemorrhage with melena         Surgery: None       Pertinent Medical History:       Past Medical History        Past Medical History:   Diagnosis Date    Hyperlipidemia              Past Surgical History         Past Surgical History:   Procedure Laterality Date    CHOLECYSTECTOMY        COLONOSCOPY   1990     neg    EYE SURGERY        HERNIA REPAIR        INTRACAPSULAR CATARACT EXTRACTION Left 2021     LEFT CATARACT EXTRACTION WITH IOL performed by Atilio DAVILA MD at [facility]            Precautions:  Fall Risk, Use lift equipment for lifting patient, Confusion      Assessment of current deficits:     [x] Functional mobility            [x]ADLs           [x] Strength                   [x]Cognition    [x] Functional transfers          [x] IADLs          [] Safety Awareness   [x]Endurance    [] Fine Coordination              [x] Balance      [] Vision/perception    []Sensation      []Gross Motor Coordination  []

## 2024-05-30 NOTE — PROGRESS NOTES
5/30/2024 3:04 PM  Nikita Archuleta  [id number]    Subjective:    Boyle catheter with sonia urine  Sitter is at the bedside  Per sitter as he has not been pulling at the Boyle catheter any further  No family present    Review of Systems  Unable to obtain due to confusion      Scheduled Meds:   potassium chloride 40 mEq in sodium chloride 0.9 % 500 mL IVPB  40 mEq IntraVENous Once    Followed by    potassium chloride 20 mEq in sodium chloride 0.9 % 250 mL IVPB  20 mEq IntraVENous Once    Followed by    potassium chloride  10 mEq IntraVENous Once    potassium chloride  40 mEq Oral Once    menthol-zinc oxide  1 each Topical BID    enoxaparin  1 mg/kg SubCUTAneous BID    meropenem  1,000 mg IntraVENous Q8H    sodium chloride flush  5-40 mL IntraVENous 2 times per day    pantoprazole (PROTONIX) 40 mg in sodium chloride (PF) 0.9 % 10 mL injection  40 mg IntraVENous Q12H    ipratropium 0.5 mg-albuterol 2.5 mg  1 Dose Inhalation Q4H WA RT    azithromycin  500 mg IntraVENous Q24H    divalproex  125 mg Oral 3 times per day       Objective:  Vitals:    05/30/24 1245   BP: (!) 122/53   Pulse: 62   Resp: 18   Temp: 98 °F (36.7 °C)   SpO2: 95%         Allergies: Patient has no known allergies.    General Appearance: Awake and alert, confused, no acute distress   skin: no rash or erythema  Head: normocephalic and atraumatic  Pulmonary/Chest: normal air movement, no respiratory distress  Abdomen: soft, non-tender, non-distended  Genitourinary: Boyle catheter with sonia urine  Extremities: no cyanosis, clubbing or edema         Labs:     Recent Labs     05/30/24  0825      K 2.9*   *   CO2 26   BUN 12   CREATININE 0.7   GLUCOSE 100*   CALCIUM 7.8*       Lab Results   Component Value Date/Time    HGB 11.6 05/30/2024 08:25 AM    HCT 35.3 05/30/2024 08:25 AM       Lab Results   Component Value Date    PSA 2.04 05/14/2024    PSA 1.53 08/21/2017         Assessment/Plan:  Urinary retention  Bladder mass  BPH    Boyle placed  on admission for unknown output.  Bladder was very distended on CT imaging prior to Boyle catheter insertion  He has a known bladder mass that was noted on previous admission on previous CT imaging.  He was to have cystoscopy with possible bladder biopsy as an outpatient  Currently palliative has been consulted. Await family treatment goals.       Joyce Treviño, APRN - CNP   [facility]  Urology

## 2024-05-30 NOTE — PROGRESS NOTES
EYE SURGERY      HERNIA REPAIR      INTRACAPSULAR CATARACT EXTRACTION Left 06/29/2021    LEFT CATARACT EXTRACTION WITH IOL performed by Atilio DAVILA MD at [facility] OR       SUBJECTIVE:    Precautions: Up with assistance, falls , confusion, dementia     Social history: Patient from [facility] skilled     Equipment owned: Wheeled Walker,      AM-PAC Basic Mobility       AM-PAC Basic Mobility - Inpatient   How much help is needed turning from your back to your side while in a flat bed without using bedrails?: A Lot  How much help is needed moving from lying on your back to sitting on the side of a flat bed without using bedrails?: A Lot  How much help is needed moving to and from a bed to a chair?: Total  How much help is needed standing up from a chair using your arms?: Total  How much help is needed walking in hospital room?: Total  How much help is needed climbing 3-5 steps with a railing?: Total  AM-PAC Inpatient Mobility Raw Score : 8  AM-PAC Inpatient T-Scale Score : 28.52  Mobility Inpatient CMS 0-100% Score: 86.62  Mobility Inpatient CMS G-Code Modifier : CM    Nursing cleared patient for PT treatment.      OBJECTIVE:   Initial Evaluation  Date: 5/29/2024 Treatment Date:    5/30/2024   Short Term/ Long Term   Goals   Was pt agreeable to Eval/treatment? Yes Yes To be met in 4 days   Pain level   0/10   0/10    Bed Mobility  Using rails and head of bed elevated:     Rolling: Moderate assist of 1    Supine to sit: Maximal assist of 1    Sit to supine: Maximal assist of 1    Scooting: Maximal assist of 1   Using rails and head of bed elevated:     Rolling: Moderate assist of 1   Supine to sit: Maximal assist of 1   Sit to supine: Maximal assist of 1   Scooting: Moderate assist of 1    Rolling: Minimal assist of 1    Supine to sit: Minimal assist of 1    Sit to supine: Minimal assist of 1    Scooting: Minimal assist of 1     Transfers Sit to stand: Not assessed  pt declined  Sit to stand: Maximal assist  reach,  all lines and tubes intact, nursing notified.      Patient would benefit from continued skilled Physical Therapy to improve functional independence and quality of life.         Patient's/ family goals   home    Time in  1027  Time out  1051    Total Treatment Time  24 minutes      CPT codes:  Therapeutic activities (41243)   16 minutes  1 unit(s)  Therapeutic exercises (68026)   8 minutes  1 unit(s)    Estefania Granda, PT

## 2024-05-30 NOTE — CONSULTS
Palliative Care Department  [phone]  Palliative Care Initial Consult  Provider Mary Barnhart, APRN - CNP      PATIENT: Nikita Archuleta  : 1940  MRN: [id number]  ADMISSION DATE: 2024  4:27 PM  Referring Provider: Yamila Camp MD     Palliative Medicine was consulted on hospital day 4 for assistance with Goals of care    HPI:     Clinical Summary:Nikita Archuleta is a 84 y.o. y/o male with a history of dementia, decubitus ulcer, hyperlipidemia who presented to [facility] on 2024 with rectal bleeding.  Blood work was significant for a sodium of 150, creatinine of 1.4, lactic acid of 4.2 hemoglobin stable at 16, WBC count of 16.3.  CT of the abdomen pelvis showed bladder outlet obstruction and multiple bladder wall masses.  Urine cytology was positive for malignancy, he was supposed to follow-up with urology outpatient.  CT of the chest showed an acute pulmonary emboli and aspiration pneumonia.  Urology was consulted but no interventions recommended.  He remains admitted to telemetry for further medical management.    ASSESSMENT/PLAN:     Pertinent Hospital Diagnoses     Rectal bleeding  Dementia  Bladder mass  Acute pulmonary embolism  Bladder outlet obstruction  Acute cystitis with hematuria  Left lower lobe pneumonia  Decubitus ulcer    Palliative Care Encounter / Counseling Regarding Goals of Care  Please see detailed goals of care discussion as below  At this time, Nikita Archuleta, Does Not have capacity for medical decision-making.  Capacity is time limited and situation/question specific  During encounter Ebony  was surrogate medical decision-maker  Outcome of goals of care meeting:  Continue with medical treatment  Continue DNR CC  Wife and daughter reviewing patient's living will, to follow his wishes  Wants to follow-up in the morning to discuss goals of care  Code status DNR-CC  Advanced Directives:  HCPOA and living will in epic  Surrogate/Legal NOK:  Malina Archuleta (spouse)  [phone]  Ebony Glass (daughter) [phone]    Spiritual assessment: no spiritual distress identified  Bereavement and grief: to be determined  Referrals to: none today    Thank you for the opportunity to participate in the care of Nikita Archuleta.     Grace Martinez, APRN - CNP   Palliative Medicine     SUBJECTIVE:     Details of Conversation:      Chart reviewed.  Patient seen at the bedside, he was awake,  present in the room.  There were no family member present.  Attempt to call patient wife Malina, was unable to get a hold of her, spoke with patient daughter Ebony over the phone, introduced myself and the role of palliative medicine.  We discussed goals of care, Ebony informed, her mother would not want patient to further investigate bladder mass, or pursue surgical or invasive procedures, they are also questioning PEG tube, patient continued to have poor p.o. intake, and she believes PEG tube may not be beneficial for her father.  She further stated, her mother seems to want to pursue comfort measures, however they are reviewing patient's his living will, to confirm his wishes, they would like to follow-up in the morning and further discuss goals of care.  CODE STATUS was discussed with chela Franklin DNR CC.      Prognosis: Guarded    OBJECTIVE:     BP (!) 110/53   Pulse 55   Temp 97.7 °F (36.5 °C) (Axillary)   Resp 18   Ht 1.829 m (6' 0.01\")   Wt 74.8 kg (165 lb)   SpO2 96%   BMI 22.37 kg/m²     Physical Examination:  Gen: elderly, thin, NAD, awake, alert to self  Lungs: respirations easy and not labored,   Heart: regular rate and rhythm, distant heart tones,   Abdomen: normoactive bowel sounds, soft, non-tender  Extremities: no clubbing, cyanosis or edema  Skin: warm, dry without rashes, lesions, bruising  Neuro: awake, alert to self      Objective data reviewed: labs, images, records, medication use, vitals, and chart    Time/Communication  Greater than 50%

## 2024-05-30 NOTE — ACP (ADVANCE CARE PLANNING)
Advance Care Planning      Palliative Medicine Provider (MD/NP)  Advance Care Planning (ACP) Conversation      Date of Conversation: 05/30/24  The patient and/or authorized decision maker consented to a voluntary Advance Care Planning conversation.   Individuals present for the conversation:   Luigi Beck     Legal Healthcare Agent(s):    Primary Decision Maker: Ebony Glass - Child - [phone]    Secondary Decision Maker: Malina Archuleta M - Spouse - [phone]    ACP documents available in EMR prior to discussion:  -Power of  for Healthcare  -Living Will    Primary Palliative Diagnosis(es):  Rectal bleed  Dementia  Bladder mass  Acute    Conversation Summary:    Patient seen at bedside, he was alert to self and unable to participate in meaningful conversation.  Unable to get a hold of patient's wife, spoke with patient daughter informed, goal is for continuation of current medical treatment, however they had discussed CODE STATUS with attending earlier, on changed CODE STATUS to DNR CC, plan is to review patient's living will and possibly move forward with comfort measures and hospice.  Family not interested for patient to pursue further investigation of bladder mass, or PEG tube placement    Resuscitation Status:    Code Status: DNR-CC    Outcomes / Completed Documentation:  An explanation of advance directives and their importance was provided and the following forms completed:    -No new documents completed.    If new document completed, original was provided to patient and/or family member.    Copy was placed for scanning into the [facility] EMR.      I spent 30 minutes providing separately identifiable ACP services with the patient and/or surrogate decision maker in a voluntary, in-person conversation discussing the patient's wishes and goals as detailed in the above note.       Grace Martinez, APRN - CNP

## 2024-05-30 NOTE — PROGRESS NOTES
SPEECH LANGUAGE PATHOLOGY  DAILY PROGRESS NOTE      PATIENT NAME:  Nikita Archuleta      :  1940          TODAY'S DATE:  2024 ROOM:  [address] [place] [address]    Current Diet: ADULT ORAL NUTRITION SUPPLEMENT; Breakfast, Lunch, Dinner; Standard High Calorie/High Protein Oral Supplement  ADULT ORAL NUTRITION SUPPLEMENT; Lunch, Dinner; Wound Healing Oral Supplement  ADULT DIET; Dysphagia - Minced and Moist    Patient seen for dysphagia therapy patient much more alert.  Patient tolerated minced and moist items with extended but functional mastication.  Limited intake despite max encouragement.   Tolerated thin via straw encouraged self feeding.     Recommendation: family present and report soft and bite sized diet is too large of solids for patient and feel minced and moist would be better.  She reports frequently having to smash up foods at home  reviewed with nursing recommended diet change      CPT code(s) 78235  dysphagia tx  Total minutes :  15 minutes    Cheyanne Ortiz MSCCC/SLP  Speech Language Pathologist  [id number]

## 2024-05-30 NOTE — PROGRESS NOTES
Detected     Colistin Resistance mcr-1 gene by PCR Not Detected     Resistant gene ndm by PCR Not Detected     Resistant gene oxa-48-like by pcr Not Detected     Resistant gene vim by PCR Not Detected    Susceptibility        Klebsiella pneumoniae ESBL      BACTERIAL SUSCEPTIBILITY PANEL NICK      ampicillin >=32  Resistant      aztreonam >=64  Resistant      ceFAZolin >=64  Resistant      cefepime >=32  Resistant      cefotaxime >=64  Resistant      cefOXitin <=4  Sensitive      cefpodoxime proxetil >=8  Resistant      cefTAZidime >=64  Resistant      Ceftolozane/Tazobactam (Zerbaxa) 1  Sensitive      Cefuroxime axetil >=64  Resistant      Cefuroxime-Sodium >=64  Resistant      ciprofloxacin >=4  Resistant      Confirmatory Extended Spectrum Beta-Lactamase POSITIVE  Resistant      doxycycline >=16  Resistant      levofloxacin 4  Resistant      meropenem <=0.25  Sensitive      Meropenem-vaborbactam <=0.5  Sensitive      minocycline 4  Sensitive      moxifloxacin >=8  Resistant      piperacillin-tazobactam >=128  Resistant      tigecycline 1  Sensitive      tobramycin >=16  Resistant      trimethoprim-sulfamethoxazole >=320  Resistant                           Culture, Blood 2 [[id number]]  (Abnormal) Collected: 05/27/24 0217    Order Status: Completed Specimen: Blood Updated: 05/30/24 0632     Specimen Description .BLOOD     Special Requests          Direct Exam DIRECT GRAM STAIN FROM BOTTLE: GRAM NEGATIVE RODS Previous panic on this admission, call not needed per  SOP.     Culture KLEBSIELLA PNEUMONIAE ESBL For susceptibility, refer to previous culture. Collected 5/27/24 at 0217 This organism is an Extended Spectrum Beta Lactamase (ESBL)  and resistance to therapy with Penicillins, Cephalosporins and Aztreonam is expected. These organisms generally remain susceptible to Carbapenems. Consider ID Consultation. CONTACT PRECAUTIONS INDICATED.               Recent Labs     05/29/24  0742 05/30/24  0825   WBC 10.3  posterior segmental artery for the posterior segment of the right lower lobe. Development of areas of bronchial pneumonia or aspiration or combination of both possibilities in the posterior basal region of the left lower lobe since the study of May 14. Preliminary report given to Dr. Simms,   physician [facility], at the end of the interpretation.     CT ABDOMEN PELVIS W IV CONTRAST Additional Contrast? None    Result Date: 5/26/2024  1. Marked distention of the urinary bladder concerning for a bladder outlet obstructive process. 2. There are multiple enhancing masses along the bladder wall concerning for urothelial neoplasm. 3. There is bilateral hydronephrosis and hydroureter. 4. There is concern for pulmonary emboli extending into the right lower lung. 5. There is a moderate volume of retained fecal debris within the colon. 6. There is reflux of contrast into the inferior vena cava concerning for underlying pulmonary hypertension.     Imaging and labs were reviewed per medical records.     Thank you for involving me in the care of Nikita Archuleta I will continue to follow. Please do not hesitate to call [phone] for any questions or concerns.    Electronically signed by Jovana Bacon MD on 5/30/2024 at 12:31 PM

## 2024-05-30 NOTE — PROGRESS NOTES
Progress Note  Date:2024       Room:0521/0521-01  Patient Name:Nikita Archuleta     YOB: 1940     Age:84 y.o.        Subjective    Subjective:  Symptoms:  Stable.  No shortness of breath, cough, chest pain, weakness or diarrhea.  ( awake and talking but disoriented , very frail).    Diet:  Poor intake.  No nausea.    Activity level: Impaired due to weakness.       Review of Systems   Constitutional:  Positive for appetite change and fatigue. Negative for chills, fever and unexpected weight change.   HENT:  Negative for congestion, facial swelling, mouth sores, rhinorrhea and sinus pain.    Eyes:  Negative for visual disturbance.   Respiratory:  Negative for cough, chest tightness, shortness of breath and wheezing.    Cardiovascular:  Negative for chest pain and leg swelling.   Gastrointestinal:  Negative for abdominal distention, abdominal pain, blood in stool, constipation, diarrhea and nausea.   Genitourinary:  Negative for difficulty urinating, dysuria, frequency and urgency.   Musculoskeletal:  Negative for joint swelling.   Skin:  Positive for wound. Negative for rash.   Neurological:  Negative for dizziness, syncope, weakness, light-headedness and headaches.   Psychiatric/Behavioral:  Positive for confusion. Negative for hallucinations.      Objective         Vitals Last 24 Hours:  TEMPERATURE:  Temp  Av.7 °F (36.5 °C)  Min: 97.3 °F (36.3 °C)  Max: 98.3 °F (36.8 °C)  RESPIRATIONS RANGE: Resp  Av  Min: 15  Max: 18  PULSE OXIMETRY RANGE: SpO2  Av.6 %  Min: 93 %  Max: 97 %  PULSE RANGE: Pulse  Av.8  Min: 55  Max: 72  BLOOD PRESSURE RANGE: Systolic (24hrs), Av , Min:107 , Max:141   ; Diastolic (24hrs), Av, Min:53, Max:65    I/O (24Hr):    Intake/Output Summary (Last 24 hours) at 2024 1238  Last data filed at 2024 0548  Gross per 24 hour   Intake --   Output 1225 ml   Net -1225 ml       Objective:  General Appearance:  In no acute distress (confused , but

## 2024-05-31 LAB
ALBUMIN SERPL-MCNC: 2.6 G/DL (ref 3.5–5.2)
ALP SERPL-CCNC: 78 U/L (ref 40–129)
ALT SERPL-CCNC: 70 U/L (ref 0–40)
ANION GAP SERPL CALCULATED.3IONS-SCNC: 8 MMOL/L (ref 7–16)
AST SERPL-CCNC: 61 U/L (ref 0–39)
BASOPHILS # BLD: 0.05 K/UL (ref 0–0.2)
BASOPHILS NFR BLD: 0 % (ref 0–2)
BILIRUB SERPL-MCNC: 0.6 MG/DL (ref 0–1.2)
BUN SERPL-MCNC: 9 MG/DL (ref 6–23)
CALCIUM SERPL-MCNC: 7.9 MG/DL (ref 8.6–10.2)
CHLORIDE SERPL-SCNC: 108 MMOL/L (ref 98–107)
CO2 SERPL-SCNC: 25 MMOL/L (ref 22–29)
CREAT SERPL-MCNC: 0.7 MG/DL (ref 0.7–1.2)
EOSINOPHIL # BLD: 0.26 K/UL (ref 0.05–0.5)
EOSINOPHILS RELATIVE PERCENT: 2 % (ref 0–6)
ERYTHROCYTE [DISTWIDTH] IN BLOOD BY AUTOMATED COUNT: 13.2 % (ref 11.5–15)
GFR, ESTIMATED: 89 ML/MIN/1.73M2
GLUCOSE SERPL-MCNC: 88 MG/DL (ref 74–99)
HCT VFR BLD AUTO: 36.8 % (ref 37–54)
HGB BLD-MCNC: 12.3 G/DL (ref 12.5–16.5)
IMM GRANULOCYTES # BLD AUTO: 0.11 K/UL (ref 0–0.58)
IMM GRANULOCYTES NFR BLD: 1 % (ref 0–5)
LYMPHOCYTES NFR BLD: 1.84 K/UL (ref 1.5–4)
LYMPHOCYTES RELATIVE PERCENT: 16 % (ref 20–42)
MCH RBC QN AUTO: 31 PG (ref 26–35)
MCHC RBC AUTO-ENTMCNC: 33.4 G/DL (ref 32–34.5)
MCV RBC AUTO: 92.7 FL (ref 80–99.9)
MICROORGANISM SPEC CULT: ABNORMAL
MONOCYTES NFR BLD: 0.71 K/UL (ref 0.1–0.95)
MONOCYTES NFR BLD: 6 % (ref 2–12)
NEUTROPHILS NFR BLD: 74 % (ref 43–80)
NEUTS SEG NFR BLD: 8.39 K/UL (ref 1.8–7.3)
PLATELET # BLD AUTO: 160 K/UL (ref 130–450)
PMV BLD AUTO: 11.4 FL (ref 7–12)
POTASSIUM SERPL-SCNC: 3.6 MMOL/L (ref 3.5–5)
PROT SERPL-MCNC: 4.8 G/DL (ref 6.4–8.3)
RBC # BLD AUTO: 3.97 M/UL (ref 3.8–5.8)
SODIUM SERPL-SCNC: 141 MMOL/L (ref 132–146)
SPECIMEN DESCRIPTION: ABNORMAL
WBC OTHER # BLD: 11.4 K/UL (ref 4.5–11.5)

## 2024-05-31 PROCEDURE — C9113 INJ PANTOPRAZOLE SODIUM, VIA: HCPCS | Performed by: FAMILY MEDICINE

## 2024-05-31 PROCEDURE — 99232 SBSQ HOSP IP/OBS MODERATE 35: CPT | Performed by: INTERNAL MEDICINE

## 2024-05-31 PROCEDURE — 94640 AIRWAY INHALATION TREATMENT: CPT

## 2024-05-31 PROCEDURE — 2060000000 HC ICU INTERMEDIATE R&B

## 2024-05-31 PROCEDURE — 6370000000 HC RX 637 (ALT 250 FOR IP): Performed by: FAMILY MEDICINE

## 2024-05-31 PROCEDURE — 6360000002 HC RX W HCPCS: Performed by: FAMILY MEDICINE

## 2024-05-31 PROCEDURE — 2500000003 HC RX 250 WO HCPCS: Performed by: INTERNAL MEDICINE

## 2024-05-31 PROCEDURE — 36415 COLL VENOUS BLD VENIPUNCTURE: CPT

## 2024-05-31 PROCEDURE — 85025 COMPLETE CBC W/AUTO DIFF WBC: CPT

## 2024-05-31 PROCEDURE — 2580000003 HC RX 258: Performed by: SPECIALIST

## 2024-05-31 PROCEDURE — 99231 SBSQ HOSP IP/OBS SF/LOW 25: CPT | Performed by: NURSE PRACTITIONER

## 2024-05-31 PROCEDURE — 2580000003 HC RX 258: Performed by: FAMILY MEDICINE

## 2024-05-31 PROCEDURE — 6360000002 HC RX W HCPCS: Performed by: INTERNAL MEDICINE

## 2024-05-31 PROCEDURE — 6360000002 HC RX W HCPCS: Performed by: SPECIALIST

## 2024-05-31 PROCEDURE — 80053 COMPREHEN METABOLIC PANEL: CPT

## 2024-05-31 PROCEDURE — A4216 STERILE WATER/SALINE, 10 ML: HCPCS | Performed by: FAMILY MEDICINE

## 2024-05-31 RX ORDER — GLYCOPYRROLATE 0.2 MG/ML
0.2 INJECTION INTRAMUSCULAR; INTRAVENOUS EVERY 4 HOURS PRN
Status: DISCONTINUED | OUTPATIENT
Start: 2024-05-31 | End: 2024-06-01 | Stop reason: HOSPADM

## 2024-05-31 RX ORDER — LORAZEPAM 2 MG/ML
1 INJECTION INTRAMUSCULAR EVERY 6 HOURS PRN
Status: DISCONTINUED | OUTPATIENT
Start: 2024-05-31 | End: 2024-06-01 | Stop reason: HOSPADM

## 2024-05-31 RX ORDER — DEXTROSE MONOHYDRATE, SODIUM CHLORIDE, AND POTASSIUM CHLORIDE 50; 1.49; 4.5 G/1000ML; G/1000ML; G/1000ML
INJECTION, SOLUTION INTRAVENOUS CONTINUOUS
Status: DISCONTINUED | OUTPATIENT
Start: 2024-05-31 | End: 2024-06-01 | Stop reason: HOSPADM

## 2024-05-31 RX ORDER — MORPHINE SULFATE 2 MG/ML
2 INJECTION, SOLUTION INTRAMUSCULAR; INTRAVENOUS
Status: DISCONTINUED | OUTPATIENT
Start: 2024-05-31 | End: 2024-06-01 | Stop reason: HOSPADM

## 2024-05-31 RX ORDER — MORPHINE SULFATE 4 MG/ML
4 INJECTION, SOLUTION INTRAMUSCULAR; INTRAVENOUS
Status: DISCONTINUED | OUTPATIENT
Start: 2024-05-31 | End: 2024-06-01 | Stop reason: HOSPADM

## 2024-05-31 RX ADMIN — SODIUM CHLORIDE, PRESERVATIVE FREE 40 MG: 5 INJECTION INTRAVENOUS at 19:10

## 2024-05-31 RX ADMIN — HALOPERIDOL LACTATE 1 MG: 5 INJECTION, SOLUTION INTRAMUSCULAR at 23:49

## 2024-05-31 RX ADMIN — ENOXAPARIN SODIUM 70 MG: 100 INJECTION SUBCUTANEOUS at 08:24

## 2024-05-31 RX ADMIN — DEXTROSE AND SODIUM CHLORIDE: 5; 200 INJECTION, SOLUTION INTRAVENOUS at 02:57

## 2024-05-31 RX ADMIN — IPRATROPIUM BROMIDE AND ALBUTEROL SULFATE 1 DOSE: .5; 2.5 SOLUTION RESPIRATORY (INHALATION) at 18:05

## 2024-05-31 RX ADMIN — IPRATROPIUM BROMIDE AND ALBUTEROL SULFATE 1 DOSE: .5; 2.5 SOLUTION RESPIRATORY (INHALATION) at 13:24

## 2024-05-31 RX ADMIN — DIVALPROEX SODIUM 125 MG: 125 CAPSULE ORAL at 15:33

## 2024-05-31 RX ADMIN — ANORECTAL OINTMENT 1 EACH: 15.7; .44; 24; 20.6 OINTMENT TOPICAL at 08:25

## 2024-05-31 RX ADMIN — MEROPENEM 1000 MG: 1 INJECTION, POWDER, FOR SOLUTION INTRAVENOUS at 08:23

## 2024-05-31 RX ADMIN — SODIUM CHLORIDE, PRESERVATIVE FREE 40 MG: 5 INJECTION INTRAVENOUS at 05:24

## 2024-05-31 RX ADMIN — IPRATROPIUM BROMIDE AND ALBUTEROL SULFATE 1 DOSE: .5; 2.5 SOLUTION RESPIRATORY (INHALATION) at 06:30

## 2024-05-31 RX ADMIN — Medication 10 ML: at 08:24

## 2024-05-31 RX ADMIN — DIVALPROEX SODIUM 125 MG: 125 CAPSULE ORAL at 05:24

## 2024-05-31 RX ADMIN — MEROPENEM 1000 MG: 1 INJECTION, POWDER, FOR SOLUTION INTRAVENOUS at 16:38

## 2024-05-31 RX ADMIN — POTASSIUM CHLORIDE, DEXTROSE MONOHYDRATE AND SODIUM CHLORIDE: 150; 5; 450 INJECTION, SOLUTION INTRAVENOUS at 11:57

## 2024-05-31 RX ADMIN — IPRATROPIUM BROMIDE AND ALBUTEROL SULFATE 1 DOSE: .5; 2.5 SOLUTION RESPIRATORY (INHALATION) at 09:49

## 2024-05-31 ASSESSMENT — ENCOUNTER SYMPTOMS
NAUSEA: 0
ABDOMINAL PAIN: 0
DIARRHEA: 0
SINUS PAIN: 0
FACIAL SWELLING: 0
CONSTIPATION: 0
RHINORRHEA: 0
BLOOD IN STOOL: 0
WHEEZING: 0
SHORTNESS OF BREATH: 0
COUGH: 0
CHEST TIGHTNESS: 0
ABDOMINAL DISTENTION: 0

## 2024-05-31 ASSESSMENT — PAIN SCALES - GENERAL: PAINLEVEL_OUTOF10: 0

## 2024-05-31 NOTE — PROGRESS NOTES
NAME: Nikita Archuleta  MR:  [id number]  :   1940  Admit Date:  2024    Elements of this note, were copied and pasted from Previous. Updates have been made where noted and reflect current exam and medical decision making from the DOS of this encounter.  CHIEF COMPLAINT       Chief Complaint   Patient presents with    Rectal Bleeding     EMS brought in from nursing home, reports patient has had three dark stools today. Patient states he has abdominal pain.      HISTORY OF PRESENT ILLNESS     Nikita Archuleta is a 84 y.o. male admitted on 2024 and has  has a past medical history of Hyperlipidemia.     This is a face to face encounter   24 has sitter in chair nad no family present    dec oral intake daughter present updated poc    Patient is tolerating medications. No reported adverse drug reactions.  Available labs, imaging studies, microbiologic studies have been reviewed   Assessment & Plan     Admitted for   GI bleed [K92.2]  Bladder mass [N32.89]  Bladder outlet obstruction [N32.0]  Gastrointestinal hemorrhage, unspecified gastrointestinal hemorrhage type [K92.2]  Pneumonia of left lower lobe due to infectious organism [J18.9]  Single subsegmental pulmonary embolism without acute cor pulmonale (HCC) [I26.93]  Sepsis with acute renal failure without septic shock, due to unspecified organism, unspecified acute renal failure type (HCC) [A41.9, R65.20, N17.9]  ID following for   KLEBSIELLA SEPTICMEIA  PROB UTI   BLADDER CA follows with urology   ACUTE PE  Coronavirus OC43     meropenem (MERREM) 1,000 mg in sodium chloride 0.9 % 100 mL IVPB (Vlbt3Csz), Q8H  Prob d/c with ertapenem for 2 weeks from   azithromycin (ZITHROMAX) 500 mg in sodium chloride 0.9 % 250 mL IVPB (Kssa3Gga), Q24H last day on          DISPOSITION:  REVIEW OF SYSTEMS     As stated above      PHYSICAL EXAMINATION    BP (!) 108/58   Pulse 95   Temp 97.9 °F (36.6 °C) (Oral)   Resp 20   Ht 1.829 m (6' 0.01\")   Wt  Intermediate      nitrofurantoin 128  Resistant      piperacillin-tazobactam >=128  Resistant      tigecycline 1  Sensitive      tobramycin >=16  Resistant      trimethoprim-sulfamethoxazole >=320  Resistant                           Culture, Wound [[id number]]     Order Status: Canceled Specimen: Decubitus     Culture, Wound [[id number]]  (Abnormal) Collected: 05/27/24 1730    Order Status: Completed Specimen: Coccyx Updated: 05/30/24 0813     Specimen Description .COCCYX     Direct Exam Swab collections are low-yield and rarely indicated. Generally, the specimen volume when collected by swab is small, reducing the probability of isolating organisms: many organisms adhere to the fibers of the swab, which reduces the opportunity or recovering organisms. Interpret results with caution.         RARE Polymorphonuclear leukocytes      NO EPITHELIAL CELLS      RARE GRAM NEGATIVE RODS      RARE GRAM POSITIVE COCCI IN PAIRS     Culture Mixed breezy isolated. Further workup and sensitivity testing not routinely indicated and will not be perfomed. Mixed breezy isolated includes: STREPTOCOCCI, ALPHA HEMOLYTIC NON HEMOLYTIC STREPTOCOCCI AND GRAM NEGATIVE ORGANISM    LEGIONELLA ANTIGEN, URINE [[id number]] Collected: 05/27/24 0743    Order Status: Completed Specimen: Urine Updated: 05/27/24 1214     Legionella Pneumophilia Ag, Urine NEGATIVE     Comment:       L. pneumophila serogroup 1 antigen not detected.  A negative result does not exclude infection with Leginella pnemophila serogroup 1 nor does   it rule out other microbial-caused respiratory infections of disease caused by other   serogroups of Legionella pneumophila.         STREP PNEUMONIAE ANTIGEN [[id number]] Collected: 05/27/24 0743    Order Status: Completed Specimen: Urine, clean catch Updated: 05/27/24 1215     Source .CLEAN CATCH URINE     Strep pneumo Ag NEGATIVE     Comment:       Presumptive Negative  suggests no current or recent pneumococcal infection.

## 2024-05-31 NOTE — PROGRESS NOTES
5/31/2024 11:57 AM  Nikita Archuleta  [id number]    Subjective:    Awake and alert  Confused   Sitter at bedside     Review of Systems  Unable to obtain due to confusion       Scheduled Meds:   potassium chloride  40 mEq Oral Once    menthol-zinc oxide  1 each Topical BID    enoxaparin  1 mg/kg SubCUTAneous BID    meropenem  1,000 mg IntraVENous Q8H    sodium chloride flush  5-40 mL IntraVENous 2 times per day    pantoprazole (PROTONIX) 40 mg in sodium chloride (PF) 0.9 % 10 mL injection  40 mg IntraVENous Q12H    ipratropium 0.5 mg-albuterol 2.5 mg  1 Dose Inhalation Q4H WA RT    azithromycin  500 mg IntraVENous Q24H    divalproex  125 mg Oral 3 times per day       Objective:  Vitals:    05/31/24 0949   BP:    Pulse:    Resp:    Temp:    SpO2: 95%         Allergies: Patient has no known allergies.    General Appearance: awake and alert, confused   Skin: no rash or erythema  Head: normocephalic and atraumatic  Pulmonary/Chest: normal air movement, no respiratory distress  Abdomen: soft, non-tender, non-distended  Genitourinary: bullard with sonia urine   Extremities: no cyanosis, clubbing or edema         Labs:     Recent Labs     05/31/24  0713      K 3.6   *   CO2 25   BUN 9   CREATININE 0.7   GLUCOSE 88   CALCIUM 7.9*       Lab Results   Component Value Date/Time    HGB 12.3 05/31/2024 07:13 AM    HCT 36.8 05/31/2024 07:13 AM       Lab Results   Component Value Date    PSA 2.04 05/14/2024    PSA 1.53 08/21/2017         Assessment/Plan:  Urinary retention   BPH   Bladder mass  UTI    Creatinine stable   Urine culture +ESBL Klebsiella Pneumoniae   Antibiotics per primary   Spoke with daughter over the phone. Discussed goals of care. They are not interested in pursing any surgical interventions at this time in regards to his bladder mass. They have changed code status to DNR-CC and are considering hospice care. Palliative care following   Continue the bullard   Change every 4 weeks  Place a depend and

## 2024-05-31 NOTE — DISCHARGE INSTRUCTIONS
Place the patients bullard to leg bag on discharge from the hospital.  Please give the patient a night bag (large bag) and bullard instructions upon discharge.  Please have the patient contact the office for bullard removal instructions.  The catheter may go red (hematuria), may go clear, and may go red.  This is a normal process, as long as the catheter is draining.  Call the office if any concerns should arise for further instructions, [phone].      Call upon discharge to schedule a follow-up visit with Helder [facility]/Tracie/Cassie ([facility]) at [phone]      Learning About Indwelling Urinary Catheter Care to Prevent Infection  Overview     A urinary catheter is a flexible plastic tube that's used to drain urine from your bladder when you can't urinate on your own. The catheter allows urine to drain from the bladder into a bag.  Two types of drainage bags may be used with a urinary catheter.  A bedside bag is a large bag that you can hang on the side of your bed or on a chair. You can use it overnight or anytime you will be sitting or lying down for a long time.  A leg bag is a small bag that you can use during the day. It is usually attached to your thigh or calf and hidden under your clothes.  Having a urinary catheter increases your risk of getting a urinary tract infection. Germs may get on the catheter and cause an infection in your bladder or kidneys. The longer you have a catheter, the more likely it is that you will get an infection. You can help prevent this problem with good hygiene and careful handling of your catheter and drainage bags.  How can you help prevent infection?  Take care to stay clean  Always wash your hands well before and after you handle your catheter.  Clean the skin around the catheter daily using soap and water. Dry with a clean towel afterward. You can shower with your catheter and drainage bag in place unless your doctor told you not to.  When you clean around the catheter,  check the surrounding skin for signs of infection. Look for things like pus and irritated, swollen, red, or tender skin around the catheter.  Be careful with your drainage bag  Always keep the drainage bag below the level of your bladder. This will help keep urine from flowing back into your bladder.  Check often to see that urine is flowing through the catheter into the drainage bag.  Empty the drainage bag when it is half full. This will keep it from overflowing or backing up.  When you empty the drainage bag, do not let the tubing or drain spout touch anything.  Keep the cap that comes with the tubing, and cover the tip of the tubing when not in use.  Be careful with your catheter  Do not unhook the catheter from the drain tube until you are ready to change the tubing and bag. That could let germs get into the tube.  Make sure that the catheter tubing does not get twisted or kinked.  Do not tug or pull on the catheter. And make sure that the drainage bag does not drag or pull on the catheter.  Do not put powder or lotion on the skin around the catheter.  Talk with your doctor about your options for sexual intercourse while wearing a catheter.  How do you empty the bag?  If your doctor has asked you to keep a record, write down the amount of urine in the bag before you empty it.  Wash your hands before and after you touch the bag.  Remove the drain spout from its sleeve at the bottom of the drainage bag.  Open the valve on the drain spout. Let the urine flow out into the toilet or a container. Be careful not to let the tubing or drain spout touch anything.  After you empty the bag, close the valve. Then put the drain spout back into its sleeve at the bottom of the collection bag.  How do you switch to a bedside bag for overnight use?  Wash your hands before and after you handle the bags.  Empty the leg bag that is attached to the tubing and catheter.  Put a clean towel under the tubing attached to the leg

## 2024-05-31 NOTE — PROGRESS NOTES
Chart reviewed. Patient seen at the bedside, disoriented. Sitter at the bedside. No family at the bedside.  Attempted to call patient's daughter, Ebony, x 2.  Unable to leave voicemail.  Will attempt at later time to determine further goals of care.

## 2024-05-31 NOTE — PROGRESS NOTES
Progress Note  Date:2024       Room:0521/0521-01  Patient Name:Nikita Archuleta     YOB: 1940     Age:84 y.o.        Subjective    Subjective:  Symptoms:  Stable.  No shortness of breath, cough, chest pain, weakness or diarrhea.  (Asleep and easily aroused , talking but disoriented , very frail).    Diet:  Poor intake.  No nausea.    Activity level: Impaired due to weakness.       Review of Systems   Constitutional:  Positive for appetite change and fatigue. Negative for chills, fever and unexpected weight change.   HENT:  Negative for congestion, facial swelling, mouth sores, rhinorrhea and sinus pain.    Eyes:  Negative for visual disturbance.   Respiratory:  Negative for cough, chest tightness, shortness of breath and wheezing.    Cardiovascular:  Negative for chest pain and leg swelling.   Gastrointestinal:  Negative for abdominal distention, abdominal pain, blood in stool, constipation, diarrhea and nausea.   Genitourinary:  Negative for difficulty urinating, dysuria, frequency and urgency.   Musculoskeletal:  Negative for joint swelling.   Skin:  Positive for wound. Negative for rash.   Neurological:  Negative for dizziness, syncope, weakness, light-headedness and headaches.   Psychiatric/Behavioral:  Positive for confusion. Negative for hallucinations.      Objective         Vitals Last 24 Hours:  TEMPERATURE:  Temp  Av.1 °F (36.7 °C)  Min: 97.8 °F (36.6 °C)  Max: 98.5 °F (36.9 °C)  RESPIRATIONS RANGE: Resp  Av.7  Min: 16  Max: 20  PULSE OXIMETRY RANGE: SpO2  Av.6 %  Min: 93 %  Max: 97 %  PULSE RANGE: Pulse  Av.1  Min: 67  Max: 95  BLOOD PRESSURE RANGE: Systolic (24hrs), Av , Min:108 , Max:141   ; Diastolic (24hrs), Av, Min:48, Max:64    I/O (24Hr):    Intake/Output Summary (Last 24 hours) at 2024 1617  Last data filed at 2024 1020  Gross per 24 hour   Intake --   Output 2775 ml   Net -2775 ml       Objective:  General Appearance:  In no acute distress

## 2024-05-31 NOTE — PLAN OF CARE
Problem: Safety - Adult  Goal: Free from fall injury  5/31/2024 1610 by Ab Cronin RN  Outcome: Progressing       Problem: Discharge Planning  Goal: Discharge to home or other facility with appropriate resources  5/31/2024 1610 by Ab Cronin RN  Outcome: Progressing     Problem: Pain  Goal: Verbalizes/displays adequate comfort level or baseline comfort level  5/31/2024 1610 by Ab Cronin RN  Outcome: Progressing     Problem: ABCDS Injury Assessment  Goal: Absence of physical injury  5/31/2024 1610 by Ab Cronin RN  Outcome: Progressing     Problem: Skin/Tissue Integrity  Goal: Absence of new skin breakdown  Description: 1.  Monitor for areas of redness and/or skin breakdown  2.  Assess vascular access sites hourly  3.  Every 4-6 hours minimum:  Change oxygen saturation probe site  4.  Every 4-6 hours:  If on nasal continuous positive airway pressure, respiratory therapy assess nares and determine need for appliance change or resting period.  5/31/2024 1610 by Ab Cronin RN  Outcome: Progressing     Problem: Nutrition Deficit:  Goal: Optimize nutritional status  5/31/2024 1610 by Ab Cronin, RN  Outcome: Progressing

## 2024-05-31 NOTE — PROGRESS NOTES
PROGRESS NOTE  By Jacob Whitt M.D.    [facility]  Dr. Zuleyka Canseco M.D.,  Dr. Filiberto Rutherford M.D.,   Dr. Steve Sam D.O.,  Dr. Bucky Alfaro M.D.,  YVONNE ButlerO.,          Nikita Archuleta  84 y.o.  male    SUBJECTIVE:  Denies abdominal pain.  Denies nausea or vomiting    OBJECTIVE:    BP (!) 127/58   Pulse 69   Temp 98.1 °F (36.7 °C) (Oral)   Resp 18   Ht 1.829 m (6' 0.01\")   Wt 74.8 kg (165 lb)   SpO2 95%   BMI 22.37 kg/m²     General: NAD/older adult  male  HEENT: Anicteric sclera/moist oral mucosa  Neck: Supple with trachea midline  Chest: Symmetric excursion/CTAB  Cor: Regular/S1-S2  Abd.: Soft/nontender and nondistended  Extr.:  No significant peripheral edema.  Decreased muscle tone and bulk throughout  Skin: Warm and dry/anicteric      DATA:    Monitor data reviewed -sinus rhythm noted.       Lab Results   Component Value Date/Time    WBC 11.4 05/31/2024 07:13 AM    RBC 3.97 05/31/2024 07:13 AM    HGB 12.3 05/31/2024 07:13 AM    HCT 36.8 05/31/2024 07:13 AM    MCV 92.7 05/31/2024 07:13 AM    MCH 31.0 05/31/2024 07:13 AM    MCHC 33.4 05/31/2024 07:13 AM    RDW 13.2 05/31/2024 07:13 AM     05/31/2024 07:13 AM    MPV 11.4 05/31/2024 07:13 AM     Lab Results   Component Value Date/Time     05/31/2024 07:13 AM    K 3.6 05/31/2024 07:13 AM     05/31/2024 07:13 AM    CO2 25 05/31/2024 07:13 AM    BUN 9 05/31/2024 07:13 AM    CREATININE 0.7 05/31/2024 07:13 AM    CALCIUM 7.9 05/31/2024 07:13 AM    BILITOT 0.6 05/31/2024 07:13 AM    ALKPHOS 78 05/31/2024 07:13 AM    AST 61 05/31/2024 07:13 AM    ALT 70 05/31/2024 07:13 AM     Lab Results   Component Value Date/Time    LIPASE 42 05/26/2024 06:36 PM     No results found for: \"AMYLASE\"      ASSESSMENT/PLAN:  Patient Active Problem List   Diagnosis    Left cataract    UTI (urinary tract infection)    GI bleed    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

## 2024-05-31 NOTE — PROGRESS NOTES
Palliative Care Department  [phone]  Palliative Care Progress Note  Provider Ivonne Vargas, APRN - CNP      PATIENT: Nikita Archuleta  : 1940  MRN: [id number]  ADMISSION DATE: 2024  4:27 PM  Referring Provider: Yamila Camp MD     Palliative Medicine was consulted on hospital day 4 for assistance with Goals of care    HPI:     Clinical Summary:Nikita Archuleta is a 84 y.o. y/o male with a history of dementia, decubitus ulcer, hyperlipidemia who presented to [facility] on 2024 with rectal bleeding.  Blood work was significant for a sodium of 150, creatinine of 1.4, lactic acid of 4.2 hemoglobin stable at 16, WBC count of 16.3.  CT of the abdomen pelvis showed bladder outlet obstruction and multiple bladder wall masses.  Urine cytology was positive for malignancy, he was supposed to follow-up with urology outpatient.  CT of the chest showed an acute pulmonary emboli and aspiration pneumonia.  Urology was consulted but no interventions recommended.  He remains admitted to telemetry for further medical management.    ASSESSMENT/PLAN:     Pertinent Hospital Diagnoses     Rectal bleeding  Dementia  Bladder mass  Acute pulmonary embolism  Bladder outlet obstruction  Acute cystitis with hematuria  Left lower lobe pneumonia  Decubitus ulcer    Palliative Care Encounter / Counseling Regarding Goals of Care  Please see detailed goals of care discussion as below  At this time, Nikita Archuleta, Does Not have capacity for medical decision-making.  Capacity is time limited and situation/question specific  During encounter Ebony & Malina were surrogate medical decision-maker  Outcome of goals of care meeting:  Transition into comfort measures  Hospice consulted  Code status DNR-CC  Advanced Directives:  HCPOA and living will in [facility]  Surrogate/Legal NOK:  Malina Archuleta (spouse) [phone]  Ebony Glass (daughter) [phone]    Spiritual assessment: no spiritual distress identified  Bereavement

## 2024-06-01 VITALS
DIASTOLIC BLOOD PRESSURE: 54 MMHG | RESPIRATION RATE: 16 BRPM | BODY MASS INDEX: 22.35 KG/M2 | HEART RATE: 84 BPM | SYSTOLIC BLOOD PRESSURE: 129 MMHG | OXYGEN SATURATION: 100 % | TEMPERATURE: 97.8 F | HEIGHT: 72 IN | WEIGHT: 165 LBS

## 2024-06-01 PROCEDURE — A4216 STERILE WATER/SALINE, 10 ML: HCPCS | Performed by: FAMILY MEDICINE

## 2024-06-01 PROCEDURE — 2580000003 HC RX 258: Performed by: SPECIALIST

## 2024-06-01 PROCEDURE — 6370000000 HC RX 637 (ALT 250 FOR IP): Performed by: FAMILY MEDICINE

## 2024-06-01 PROCEDURE — C9113 INJ PANTOPRAZOLE SODIUM, VIA: HCPCS | Performed by: FAMILY MEDICINE

## 2024-06-01 PROCEDURE — 94640 AIRWAY INHALATION TREATMENT: CPT

## 2024-06-01 PROCEDURE — 2500000003 HC RX 250 WO HCPCS: Performed by: INTERNAL MEDICINE

## 2024-06-01 PROCEDURE — 6360000002 HC RX W HCPCS: Performed by: INTERNAL MEDICINE

## 2024-06-01 PROCEDURE — 6360000002 HC RX W HCPCS: Performed by: FAMILY MEDICINE

## 2024-06-01 PROCEDURE — 2580000003 HC RX 258: Performed by: INTERNAL MEDICINE

## 2024-06-01 PROCEDURE — 99239 HOSP IP/OBS DSCHRG MGMT >30: CPT | Performed by: INTERNAL MEDICINE

## 2024-06-01 PROCEDURE — 6360000002 HC RX W HCPCS: Performed by: SPECIALIST

## 2024-06-01 PROCEDURE — 2580000003 HC RX 258: Performed by: FAMILY MEDICINE

## 2024-06-01 PROCEDURE — 6360000002 HC RX W HCPCS: Performed by: NURSE PRACTITIONER

## 2024-06-01 RX ADMIN — DIVALPROEX SODIUM 125 MG: 125 CAPSULE ORAL at 15:53

## 2024-06-01 RX ADMIN — Medication 10 ML: at 08:51

## 2024-06-01 RX ADMIN — AZITHROMYCIN MONOHYDRATE 500 MG: 500 INJECTION, POWDER, LYOPHILIZED, FOR SOLUTION INTRAVENOUS at 00:12

## 2024-06-01 RX ADMIN — MEROPENEM 1000 MG: 1 INJECTION, POWDER, FOR SOLUTION INTRAVENOUS at 08:58

## 2024-06-01 RX ADMIN — MEROPENEM 1000 MG: 1 INJECTION, POWDER, FOR SOLUTION INTRAVENOUS at 00:18

## 2024-06-01 RX ADMIN — DIVALPROEX SODIUM 125 MG: 125 CAPSULE ORAL at 06:21

## 2024-06-01 RX ADMIN — IPRATROPIUM BROMIDE AND ALBUTEROL SULFATE 1 DOSE: .5; 2.5 SOLUTION RESPIRATORY (INHALATION) at 09:58

## 2024-06-01 RX ADMIN — ENOXAPARIN SODIUM 70 MG: 100 INJECTION SUBCUTANEOUS at 00:18

## 2024-06-01 RX ADMIN — MEROPENEM 1000 MG: 1 INJECTION, POWDER, FOR SOLUTION INTRAVENOUS at 16:16

## 2024-06-01 RX ADMIN — SODIUM CHLORIDE, PRESERVATIVE FREE 40 MG: 5 INJECTION INTRAVENOUS at 06:21

## 2024-06-01 RX ADMIN — ENOXAPARIN SODIUM 70 MG: 100 INJECTION SUBCUTANEOUS at 08:50

## 2024-06-01 RX ADMIN — DIVALPROEX SODIUM 125 MG: 125 CAPSULE ORAL at 00:18

## 2024-06-01 RX ADMIN — HALOPERIDOL LACTATE 1 MG: 5 INJECTION, SOLUTION INTRAMUSCULAR at 16:09

## 2024-06-01 RX ADMIN — ANORECTAL OINTMENT 1 EACH: 15.7; .44; 24; 20.6 OINTMENT TOPICAL at 00:19

## 2024-06-01 RX ADMIN — POTASSIUM CHLORIDE, DEXTROSE MONOHYDRATE AND SODIUM CHLORIDE: 150; 5; 450 INJECTION, SOLUTION INTRAVENOUS at 01:31

## 2024-06-01 RX ADMIN — LORAZEPAM 1 MG: 2 INJECTION INTRAMUSCULAR; INTRAVENOUS at 08:50

## 2024-06-01 RX ADMIN — ANORECTAL OINTMENT 1 EACH: 15.7; .44; 24; 20.6 OINTMENT TOPICAL at 08:51

## 2024-06-01 RX ADMIN — IPRATROPIUM BROMIDE AND ALBUTEROL SULFATE 1 DOSE: .5; 2.5 SOLUTION RESPIRATORY (INHALATION) at 06:27

## 2024-06-01 RX ADMIN — Medication 10 ML: at 00:19

## 2024-06-01 RX ADMIN — IPRATROPIUM BROMIDE AND ALBUTEROL SULFATE 1 DOSE: .5; 2.5 SOLUTION RESPIRATORY (INHALATION) at 13:48

## 2024-06-01 NOTE — PROGRESS NOTES
PROGRESS NOTE  By Burt Poole, ANGELINEP    [facility]  Dr. Zuleyka Canseco M.D.,  Dr. Filiberto Rutherford M.D.,   Dr. Steve Sam D.NEELAM.,  Dr. Bucky Alfaro M.D.,  Dr. Aj Rader D.O.,          Nikita Archuleta  84 y.o.  male    SUBJECTIVE:  Patient resting in bed.  Confused.  Sitter at bedside.    OBJECTIVE:    /61   Pulse 83   Temp 97.7 °F (36.5 °C) (Oral)   Resp 16   Ht 1.829 m (6' 0.01\")   Wt 74.8 kg (165 lb)   SpO2 95%   BMI 22.37 kg/m²     General: NAD/older adult  male  HEENT: Anicteric sclera/moist oral mucosa  Neck: Supple with trachea midline  Chest: Symmetric excursion/CTAB  Cor: Regular/S1-S2  Abd.: Soft/nontender and nondistended  Extr.:  No significant peripheral edema.  Decreased muscle tone and bulk throughout  Skin: Warm and dry/anicteric      DATA:    Monitor data reviewed -sinus rhythm noted.       Lab Results   Component Value Date/Time    WBC 11.4 05/31/2024 07:13 AM    RBC 3.97 05/31/2024 07:13 AM    HGB 12.3 05/31/2024 07:13 AM    HCT 36.8 05/31/2024 07:13 AM    MCV 92.7 05/31/2024 07:13 AM    MCH 31.0 05/31/2024 07:13 AM    MCHC 33.4 05/31/2024 07:13 AM    RDW 13.2 05/31/2024 07:13 AM     05/31/2024 07:13 AM    MPV 11.4 05/31/2024 07:13 AM     Lab Results   Component Value Date/Time     05/31/2024 07:13 AM    K 3.6 05/31/2024 07:13 AM     05/31/2024 07:13 AM    CO2 25 05/31/2024 07:13 AM    BUN 9 05/31/2024 07:13 AM    CREATININE 0.7 05/31/2024 07:13 AM    CALCIUM 7.9 05/31/2024 07:13 AM    BILITOT 0.6 05/31/2024 07:13 AM    ALKPHOS 78 05/31/2024 07:13 AM    AST 61 05/31/2024 07:13 AM    ALT 70 05/31/2024 07:13 AM     Lab Results   Component Value Date/Time    LIPASE 42 05/26/2024 06:36 PM     No results found for: \"AMYLASE\"      ASSESSMENT/PLAN:  Patient Active Problem List   Diagnosis    Left cataract    UTI (urinary tract infection)    GI bleed    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  Bladder mass  -Urine cytology 5/14/2024 showing specimen positive for high-grade urothelial carcinoma  -Urology input appreciated  -Per admitting/urology     7.  Comorbidities  -Per admitting     No recurrent rectal bleed.  Stabilized H&H without evidence of overt bleed.      Change in CODE STATUS noted.  Patient DNR CC.  Per nursing, to be seen by hospice.  GI will follow as needed while in the hospital.  Please contact our service with additional concerns.  Thank you for the opportunity to participate in the care of Mr. Archuleta.     Erick Garretts, APRN - CNP  6/1/2024  8:13 AM    NOTE:  This report was transcribed using voice recognition software.  Every effort was made to ensure accuracy; however, inadvertent computerized transcription errors may be present.

## 2024-06-01 NOTE — CARE COORDINATION
5/31/24 1816 CM note: CM note: (+) coronavirus OC 5/28/24, (-) covid 5/28/24, urine cx (+) 5/27/24, wound cx (+) 5/27/24, bl cx (+) 5/27/24. IVF and IV merrem & zithromax. PIV x 2. Hx alzheimer's dementia. 1:1 sitter. Room air. Palliative following. Pt made a DNRCC per family request, and hospice consult placed. Spoke with pts dtr Ebony regarding hospice. Ebony states she would like her dad to return to [facility] dementia unit with hospice services. Per [facility], the facility started the Medicaid application for pt already, so as long as he gets approved for Medicaid his room and board will be covered  under Medicaid because it dates back to the day they applied. IF he doesn't get approved then he would owe $240 per day for room and board. Updated Ebony on above and she agrees to proceed with return to [facility] w/ hospice services stating \"we don't really have a choice because my mom can't care for him at home\". Ebony choiced for [facility] [phone] and referral  was Day, and clinicals faxed as requested. Day is going to meet with Ebony olvera at 8:00 at her home. Requested Day call JORGE LUIS Hodges tomorrow to update her if family wants to proceed with hospice and if [facility] is ready to start services at [facility]. Updated [facility] ir[facility], on above and they are willing to accept patient tomorrow under [facility] hospice services if he is ready to discharge. CM/SW will follow. Electronically signed by Keiry Bui RN on 5/31/2024 at 6:32 PM    
6/1/2024: SS NOTE:  Sw notified by Sidra, from [facility] that Day did meet with pt's daughter, Ebony and their agency has accepted pt for hospice care and services will start at [facility] once pt is medically discharged, Nursing informed.Electronically signed by SHEBA Bay on 6/1/2024 at 3:24 PM  
to return to the hospital?: Skilled Unit  Does the patient report anything that got in the way of taking their medications?: No  In our efforts to provide the best possible care to you and others like you, can you think of anything that we could have done to help you after you left the hospital the first time, so that you might not have needed to return so soon?: Other (Comment) (no)     Advance Directives:      Code Status: Full Code   Patient's Primary Decision Maker is: Legal Next of Kin    Primary Decision Maker: QuanEbony mukherjee - Child - [phone]    Secondary Decision Maker: Malina Archuleta M - Spouse - [phone]    Discharge Planning:    Patient lives with: Alone Type of Home: Skilled Nursing Facility  Primary Care Giver: Other (Comment) (pt from [facility] Skilled secure dementia unit)  Patient Support Systems include: Spouse/Significant Other, Children   Current Financial resources:    Current community resources:    Current services prior to admission: Skilled Nursing Facility            Current DME:              Type of Home Care services:  Skilled Therapy, Nursing Services    ADLS  Prior functional level: Assistance with the following:, Bathing, Dressing, Toileting, Mobility  Current functional level: Assistance with the following:, Bathing, Dressing, Toileting, Mobility    PT AM-PAC: 8 /24  OT AM-PAC: 12 /24    Family can provide assistance at DC: No  Would you like Case Management to discuss the discharge plan with any other family members/significant others, and if so, who? Yes (pts wife Malina and dtr Eboyn)  Plans to Return to Present Housing: Yes  Other Identified Issues/Barriers to RETURNING to current housing: yes  Potential Assistance needed at discharge: Intermediate Care            Potential DME:    Patient expects to discharge to: Skilled nursing facility  Plan for transportation at discharge:      Financial    Payor: MEDICARE / Plan: MEDICARE PART A AND B / Product Type: *No

## 2024-06-01 NOTE — PLAN OF CARE
Problem: Safety - Adult  Goal: Free from fall injury  6/1/2024 0506 by Emily Lemus RN  Outcome: Progressing  5/31/2024 1610 by Ab Cronin RN  Outcome: Progressing     Problem: Discharge Planning  Goal: Discharge to home or other facility with appropriate resources  6/1/2024 0506 by Emily Lemus RN  Outcome: Progressing  5/31/2024 1610 by Ab Cronin RN  Outcome: Progressing  Flowsheets (Taken 5/31/2024 1600 by Anjelica Pedersen RN)  Discharge to home or other facility with appropriate resources:   Identify barriers to discharge with patient and caregiver   Arrange for needed discharge resources and transportation as appropriate   Identify discharge learning needs (meds, wound care, etc)   Arrange for interpreters to assist at discharge as needed   Refer to discharge planning if patient needs post-hospital services based on physician order or complex needs related to functional status, cognitive ability or social support system     Problem: Pain  Goal: Verbalizes/displays adequate comfort level or baseline comfort level  6/1/2024 0506 by Emily Lemus RN  Outcome: Progressing  5/31/2024 1610 by Ab Cronin RN  Outcome: Progressing     Problem: ABCDS Injury Assessment  Goal: Absence of physical injury  6/1/2024 0506 by Emily Lemus RN  Outcome: Progressing  5/31/2024 1610 by Ab Cronin RN  Outcome: Progressing     Problem: Skin/Tissue Integrity  Goal: Absence of new skin breakdown  Description: 1.  Monitor for areas of redness and/or skin breakdown  2.  Assess vascular access sites hourly  3.  Every 4-6 hours minimum:  Change oxygen saturation probe site  4.  Every 4-6 hours:  If on nasal continuous positive airway pressure, respiratory therapy assess nares and determine need for appliance change or resting period.  6/1/2024 0506 by Emily Lemus RN  Outcome: Progressing  5/31/2024 1610 by Ab Cronin RN  Outcome: Progressing     Problem: Nutrition Deficit:  Goal:  Referred by: Jeremias Quevedo MD; Medical Diagnosis (from order):    Diagnosis Information      Diagnosis    781.2 (ICD-9-CM) - R26.9 (ICD-10-CM) - Abnormal gait                Physical Therapy -  Daily Treatment Note -  Discharge Summary    Visit:  8     SUBJECTIVE                                                                                                             The patient fell on the rug. Sustained rug burn on bilateral elbows due to trying to get up from the floor. She reports \"just falling\". She does not remember if she was walking or getting out of bed. She wants to be done with therapy and talk to doctor to determine next steps. She did not eat breakfast today.    Current functional limitations: The patient fell on 2/03/20 and went do ED. She thinks her balance and walking has gotten a little better. The patient feels she knows what to work on. The patient did not eat that day.         OBJECTIVE                                                                                                                     Observed Gait:   Analysis: decreased mary/pace and wide base of support;    Patient ambulates into clinic with shuffling gait pattern and requires verbal cueing constantly to increase stride length in increase her NEEL.        Strength  out of 5 unless otherwise indicated, standard test position unless noted, lbs tested with hand held dynamometer:   Hip Extension: Left: 3+ Right: 3+,   Hip Abduction: Left: 3+ Right: 3+,         Balance Tests:   Dynamic Gait Index: 7    Interpretation: < or = 19/24 = predictive of falls in the elderly, >22/24= safe ambulatory     Minimal Detectable Change: 2.9 points; Minimally Clinically Important Difference: 1.9 points    TREATMENT                                                                                                                  Therapeutic Exercise:  NuStep, seat 5, UEs 0, level 5 x8 min  Discharge, measurements, and DGI performed  Gait  Optimize nutritional status  6/1/2024 0506 by Emily Lemus, RN  Outcome: Progressing  5/31/2024 1610 by Ab Cronin, RN  Outcome: Progressing      Training:        Skilled input: verbal instruction/cues, posture correction and facilitation    Home Exercise Program: (*above indicates provided as part of home exercise program)   Access Code: [email or web address]   Date: 01/13/2020     Exercises Supine Bridge - 10 reps - 2 sets - 1x daily - 7x weekly   Clamshell - 10 reps - 1 sets - 1x daily - 7x weekly   Hooklying Isometric Clamshell - 10 reps - 2 sets - 1x daily - 7x weekly   Small Range Straight Leg Raise - 10 reps - 1 sets - 1x daily - 7x weekly         ASSESSMENT                                                                                                             The patient has been seen for skilled physical therapy for 10 visits. She has not met any functional goals. Progress has been limited due to difficulties following directions and required maximal amount of cues and instructions throughout the session. The patient continues to have a shuffling gait pattern with wide base of support. She recently fell on 2/03/2020 and went to the emergency room. No major injuries were sustained. The patient has been discharged from skilled physical therapy and will follow up with primary doctor.     To date the patient has made gains not as expected due to  Poor HEP carry over and difficult time following directions. The patient is easily distracted. as reported. Patient will continue to benefit from skilled care as outlined. and Patient continues to have impairments and functional deficits as noted.  Patient Education:   Results of above outlined education: Verbalizes understanding and Needs reinforcement    My signature indicates my immediate presence and direction of student performance. I am responsible for all treatment, assessment, documentation and billing rendered for this patient.   Anna Owens, PT       PLAN                                                                                                                             Suggestions for next session as  indicated: Discharged the patient.     GOALS                                                                                                                       Long Term Goals: To be met by end of plan of care:      Home Exercise Program: Independent with progressed and modified home exercise program (HEP)      Status:  Not met  Status Details: Poor HEP carry over.     Patient Reported Outcome Measure: Improvement in function /disability/impairment as indicated by Activities-Specific Balance Confidence Scale > or =   30     Status:  Not met, Not assessed.     DGI: Patient will improve DGI score to at least 17 to improve safety of community ambulation.      Status:  Not met; The patient has a difficult time following directions and needs constant cueing and reminders. The patient is a fall risk.   Strength: Patient will improve bilateral hip extension strength to at least 4-/5 to help assist with improving stair navigation tolerance.       Status:  Not met; Poor HEP carry over.   Strength: Patient will improve bilateral hip abduction strength to at least 4/5 to help assist with gait mechanics.       Status:  Not met; Poor HEP carry over.       Procedures and total treatment time documented Time Entry flowsheet.

## 2024-06-02 NOTE — DISCHARGE SUMMARY
[facility] Hospitalist       Hospitalist Physician Discharge Summary       [facility]  [address] [id number] [phone]  Go today      [facility]  [address]  [place] [facility] [address]  [phone]  Call today        Activity level: bedridden     Diet: ADULT ORAL NUTRITION SUPPLEMENT; Breakfast, Lunch, Dinner; Standard High Calorie/High Protein Oral Supplement  ADULT ORAL NUTRITION SUPPLEMENT; Lunch, Dinner; Wound Healing Oral Supplement  ADULT DIET; Dysphagia - Minced and Moist      Condition at discharge: terminal     [facility]       Patient ID:  Nikita Archuleta  [id number]  84 y.o.  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 *      Consults:  IP CONSULT TO GI  IP CONSULT TO GI  IP CONSULT TO DIETITIAN  IP CONSULT TO  the radiation dose to as low as reasonably achievable. COMPARISON: None. HISTORY: ORDERING SYSTEM PROVIDED HISTORY: abdominal pain and GIB TECHNOLOGIST PROVIDED HISTORY: Reason for exam:->abdominal pain and GIB Additional Contrast?->None Decision Support Exception - unselect if not a suspected or confirmed emergency medical condition->Emergency Medical Condition (MA) FINDINGS: Lower Chest: There are bibasilar ground-glass opacity may represent dependent edema and atelectasis.  No discrete masses were identified.  There is concern for emboli 2 branches extending into the right lower lung posteriorly there is significant cardiac motion artifact the limits evaluation for an RV-LV ratio.  There is however reflux of contrast into the inferior vena cava and this may indicate signs of underlying pulmonary hypertension. Organs: The liver revealed a homogeneous density.  There are no signs of a discrete mass or duct dilation.  The gallbladder is surgically absent. Pancreas demonstrates slight duct dilation but this is nonspecific.  Pancreas and adrenal glands revealed no acute abnormalities. Each kidney appears normal in size shape and position without stones or hydronephrosis.  Extending from the inferior pole the right kidney there is an exophytic cyst measuring 3.6 cm.  The ureter is dilated bilaterally with signs of bilateral hydroureter.  The urinary bladder is markedly distended. 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  The largest lesion to the right of the midline anteriorly measures 31 x 25 mm and posteriorly to the left the largest lesion measures 22 x 26 mm.  Direct visualization may be helpful in further characterization. GI/Bowel: No acute abnormalities of the stomach were observed.  The small bowel pattern is nonobstructive.  The appendix cecum and terminal ileum appear within the normal range.

## 2024-06-10 PROBLEM — N39.0 UTI (URINARY TRACT INFECTION): Status: RESOLVED | Noted: 2024-05-11 | Resolved: 2024-06-10

## (undated) DEVICE — ENCORE® LATEX MICRO SIZE 8.5, STERILE LATEX POWDER-FREE SURGICAL GLOVE: Brand: ENCORE

## (undated) DEVICE — 3 ML SYRINGE LUER-LOCK TIP: Brand: MONOJECT

## (undated) DEVICE — SURGICAL PROCEDURE PACK CATRCT LT EYE BASIC CUST ST JOS LF

## (undated) DEVICE — Device

## (undated) DEVICE — SOLUTION IV IRRIG WATER 1000ML POUR BRL 2F7114

## (undated) DEVICE — SOLUTION IV IRRIG POUR BRL 0.9% SODIUM CHL 2F7124

## (undated) DEVICE — SOLUTION SCRB 32OZ 7.5% POVIDONE IOD BTL GENTLE EFFECTIVE

## (undated) DEVICE — STERILE POLYISOPRENE POWDER-FREE SURGICAL GLOVES: Brand: PROTEXIS